# Patient Record
Sex: MALE | Race: WHITE | Employment: FULL TIME | URBAN - METROPOLITAN AREA
[De-identification: names, ages, dates, MRNs, and addresses within clinical notes are randomized per-mention and may not be internally consistent; named-entity substitution may affect disease eponyms.]

---

## 2018-02-21 ENCOUNTER — OFFICE VISIT (OUTPATIENT)
Dept: INTERNAL MEDICINE CLINIC | Age: 63
End: 2018-02-21

## 2018-02-21 VITALS
TEMPERATURE: 97.2 F | HEIGHT: 71 IN | HEART RATE: 52 BPM | BODY MASS INDEX: 33.6 KG/M2 | WEIGHT: 240 LBS | DIASTOLIC BLOOD PRESSURE: 80 MMHG | OXYGEN SATURATION: 96 % | RESPIRATION RATE: 16 BRPM | SYSTOLIC BLOOD PRESSURE: 118 MMHG

## 2018-02-21 DIAGNOSIS — R53.82 CHRONIC FATIGUE: ICD-10-CM

## 2018-02-21 DIAGNOSIS — Z12.5 PROSTATE CANCER SCREENING: ICD-10-CM

## 2018-02-21 DIAGNOSIS — N52.9 IMPOTENCE: ICD-10-CM

## 2018-02-21 DIAGNOSIS — Z00.00 ANNUAL PHYSICAL EXAM: Primary | ICD-10-CM

## 2018-02-21 NOTE — PROGRESS NOTES
1. Have you been to the ER, urgent care clinic since your last visit? Hospitalized since your last visit? No    2. Have you seen or consulted any other health care providers outside of the [address] [place] [address] since your last visit? Include any pap smears or colon screening.  No

## 2018-02-22 NOTE — PROGRESS NOTES
The patient presents to the office today for a check up    HPI    The patient presents to the office today for a check up. The patient remains on no medications. The patient has no complaints but he notes that his sexual function is poor. Review of Systems   Respiratory: Negative for shortness of breath. Cardiovascular: Negative for chest pain and leg swelling. Allergies   Allergen Reactions    Demerol [Meperidine] Nausea Only    Penicillin G Not Reported This Time           Past Medical History:   Diagnosis Date    Abdominal pain     Bursitis        Past Surgical History:   Procedure Laterality Date    HX ACL RECONSTRUCTION Left     HX LUMBAR FUSION  1975 & 1998    x2       Social History     Social History    Marital status:      Spouse name: N/A    Number of children: N/A    Years of education: N/A     Occupational History    Not on file. Social History Main Topics    Smoking status: Never Smoker    Smokeless tobacco: Never Used    Alcohol use No    Drug use: No    Sexual activity: Not on file     Other Topics Concern    Not on file     Social History Narrative       Patient does not have an advanced directive on file    Visit Vitals    /80 (BP 1 Location: Left arm, BP Patient Position: Sitting)    Pulse (!) 52    Temp 97.2 °F (36.2 °C) (Tympanic)    Resp 16    Ht 5' 11\" (1.803 m)    Wt 240 lb (108.9 kg)    SpO2 96%    BMI 33.47 kg/m2       Physical Exam   No Cervical Lymphadenopathy  No Supraclavicular Lymphadenopathy  Thyroid is Normal  Lungs are normal to percussion. Clear to auscultation   Heart:  S1 S2 are normal, No gallops, No mummers  No Carotid Bruits  Abdomen:  Normal Bowel Sounds. No tenderness. No masses. No Hepatomegaly or Splenomegly  LE:  Strong Pedal Pulses.   No Edema      BMI:  The patient was advised to limit calories to 2000 johanne and carbohydrates to 100 grams daily      No visits with results within 3 Month(s) from this visit. Latest known visit with results is:    Orders Only on 11/03/2016   Component Date Value Ref Range Status    % Free testosterone 11/03/2016 2.02  1.50 - 4.2 % Final    Free testosterone (Direct) 11/03/2016 8.58  5.00 - 21 ng/dL Final    Testosterone 11/03/2016 425  348 - 1197 ng/dL Final    Comment 11/03/2016 Comment   Final    Comment: Adult male reference interval is based on a population of lean males  up to 36years old. Performed at:  [address]  [id number]  : Chase Gama MD, Phone:  [phone]         . No results found for any visits on 02/21/18. Assessment / Plan      ICD-10-CM ICD-9-CM    1. Annual physical exam Z00.00 V70.0 CBC WITH AUTOMATED DIFF      METABOLIC PANEL, COMPREHENSIVE      LIPID PANEL      PSA SCREENING (SCREENING)      TESTOSTERONE, FREE & TOTAL   2. Prostate cancer screening Z12.5 V76.44 CBC WITH AUTOMATED DIFF      METABOLIC PANEL, COMPREHENSIVE      LIPID PANEL      PSA SCREENING (SCREENING)      TESTOSTERONE, FREE & TOTAL   3. Impotence N52.9 607.84 CBC WITH AUTOMATED DIFF      METABOLIC PANEL, COMPREHENSIVE      LIPID PANEL      PSA SCREENING (SCREENING)      TESTOSTERONE, FREE & TOTAL   4. Chronic fatigue R53.82 780.79 CBC WITH AUTOMATED DIFF      METABOLIC PANEL, COMPREHENSIVE      LIPID PANEL      PSA SCREENING (SCREENING)      TESTOSTERONE, FREE & TOTAL     Labs  I advised the patient to continue good health habits  he was advised to continue his maintenance medications    Follow-up Disposition:  Return in about 8 months (around 10/21/2018). I asked Juan Antonio Randolph if he has any questions and I answered the questions.   Juan Antonio Randolph states that he understands the treatment plan and agrees with the treatment plan

## 2018-02-28 LAB
% FREE TESTOSTERONE: 2.4 %
A-G RATIO,AGRAT: 2.2 RATIO (ref 1.1–2.6)
ABSOLUTE LYMPHOCYTE COUNT, 10803: 1 K/UL (ref 1–4.8)
ALBUMIN SERPL-MCNC: 4.4 G/DL (ref 3.5–5)
ALP SERPL-CCNC: 71 U/L (ref 40–125)
ALT SERPL-CCNC: 20 U/L (ref 5–40)
ANION GAP SERPL CALC-SCNC: 13 MMOL/L
AST SERPL W P-5'-P-CCNC: 21 U/L (ref 10–37)
BASOPHILS # BLD: 0 K/UL (ref 0–0.2)
BASOPHILS NFR BLD: 1 % (ref 0–2)
BILIRUB SERPL-MCNC: 0.7 MG/DL (ref 0.2–1.2)
BUN SERPL-MCNC: 19 MG/DL (ref 6–22)
CALCIUM SERPL-MCNC: 9.3 MG/DL (ref 8.4–10.4)
CHLORIDE SERPL-SCNC: 105 MMOL/L (ref 98–110)
CHOLEST SERPL-MCNC: 168 MG/DL (ref 110–200)
CO2 SERPL-SCNC: 26 MMOL/L (ref 20–32)
CREAT SERPL-MCNC: 1 MG/DL (ref 0.8–1.6)
EOSINOPHIL # BLD: 0 K/UL (ref 0–0.5)
EOSINOPHIL NFR BLD: 1 % (ref 0–6)
ERYTHROCYTE [DISTWIDTH] IN BLOOD BY AUTOMATED COUNT: 13.6 % (ref 10–16)
FREE TESTOSTERONE,DIRECT, 144981: 10.51 NG/DL
GFRAA, 66117: >60
GFRNA, 66118: >60
GLOBULIN,GLOB: 2 G/DL (ref 2–4)
GLUCOSE SERPL-MCNC: 106 MG/DL (ref 70–99)
GRANULOCYTES,GRANS: 66 % (ref 40–75)
HCT VFR BLD AUTO: 46.7 % (ref 39.3–51.6)
HDLC SERPL-MCNC: 3 MG/DL (ref 0–5)
HDLC SERPL-MCNC: 56 MG/DL (ref 40–59)
HGB BLD-MCNC: 15.6 G/DL (ref 13.1–17.2)
LDLC SERPL CALC-MCNC: 97 MG/DL (ref 50–99)
LYMPHOCYTES, LYMLT: 27 % (ref 27–45)
MCH RBC QN AUTO: 30 PG (ref 26–34)
MCHC RBC AUTO-ENTMCNC: 33 G/DL (ref 32–36)
MCV RBC AUTO: 90 FL (ref 80–95)
MONOCYTES # BLD: 0.2 K/UL (ref 0.1–0.9)
MONOCYTES NFR BLD: 6 % (ref 3–9)
NEUTROPHILS # BLD AUTO: 2.3 K/UL (ref 1.8–7.7)
PLATELET # BLD AUTO: 123 K/UL (ref 140–440)
PMV BLD AUTO: 13.6 FL (ref 6–10.8)
POTASSIUM SERPL-SCNC: 5 MMOL/L (ref 3.5–5.5)
PROT SERPL-MCNC: 6.4 G/DL (ref 6.2–8.1)
PSA SERPL-MCNC: 0.5 NG/ML
RBC # BLD AUTO: 5.19 M/UL (ref 3.8–5.8)
SODIUM SERPL-SCNC: 144 MMOL/L (ref 133–145)
TESTOSTERONE: 438 NG/DL
TRIGL SERPL-MCNC: 75 MG/DL (ref 40–149)
VLDLC SERPL CALC-MCNC: 15 MG/DL (ref 8–30)
WBC # BLD AUTO: 3.6 K/UL (ref 4–11)

## 2018-03-01 ENCOUNTER — TELEPHONE (OUTPATIENT)
Dept: INTERNAL MEDICINE CLINIC | Age: 63
End: 2018-03-01

## 2019-07-29 ENCOUNTER — OFFICE VISIT (OUTPATIENT)
Dept: UROLOGY | Age: 64
End: 2019-07-29

## 2019-07-29 VITALS
HEIGHT: 71 IN | BODY MASS INDEX: 35.28 KG/M2 | TEMPERATURE: 97.9 F | SYSTOLIC BLOOD PRESSURE: 146 MMHG | DIASTOLIC BLOOD PRESSURE: 72 MMHG | RESPIRATION RATE: 12 BRPM | OXYGEN SATURATION: 97 % | HEART RATE: 53 BPM | WEIGHT: 252 LBS

## 2019-07-29 DIAGNOSIS — R35.1 NOCTURIA: Primary | ICD-10-CM

## 2019-07-29 LAB
BILIRUB UR QL STRIP: NEGATIVE
GLUCOSE UR-MCNC: NEGATIVE MG/DL
KETONES P FAST UR STRIP-MCNC: NEGATIVE MG/DL
PH UR STRIP: 5.5 [PH] (ref 4.6–8)
PROT UR QL STRIP: NEGATIVE
SP GR UR STRIP: 1.02 (ref 1–1.03)
UA UROBILINOGEN AMB POC: NORMAL (ref 0.2–1)
URINALYSIS CLARITY POC: CLEAR
URINALYSIS COLOR POC: NORMAL
URINE BLOOD POC: NEGATIVE
URINE LEUKOCYTES POC: NEGATIVE
URINE NITRITES POC: NEGATIVE

## 2019-07-29 RX ORDER — LOSARTAN POTASSIUM 50 MG/1
TABLET ORAL
COMMUNITY
Start: 2019-05-22 | End: 2020-07-16

## 2019-07-29 RX ORDER — POTASSIUM CHLORIDE 750 MG/1
TABLET, EXTENDED RELEASE ORAL
Qty: 30 TAB | Refills: 6 | Status: SHIPPED | OUTPATIENT
Start: 2019-07-29 | End: 2020-07-16

## 2019-07-29 RX ORDER — SERTRALINE HYDROCHLORIDE 100 MG/1
TABLET, FILM COATED ORAL
Refills: 0 | COMMUNITY
Start: 2019-07-10

## 2019-07-29 RX ORDER — TRAZODONE HYDROCHLORIDE 50 MG/1
TABLET ORAL
COMMUNITY
End: 2021-09-09

## 2019-07-29 RX ORDER — SERTRALINE HYDROCHLORIDE 50 MG/1
TABLET, FILM COATED ORAL
Refills: 0 | COMMUNITY
Start: 2019-07-10 | End: 2021-06-09

## 2019-07-29 RX ORDER — SILDENAFIL 100 MG/1
TABLET, FILM COATED ORAL
COMMUNITY
End: 2021-12-09

## 2019-07-29 RX ORDER — CHLORTHALIDONE 25 MG/1
TABLET ORAL
Refills: 0 | COMMUNITY
Start: 2019-06-22 | End: 2020-07-16

## 2019-07-29 RX ORDER — FUROSEMIDE 20 MG/1
TABLET ORAL
Qty: 30 TAB | Refills: 6 | Status: SHIPPED | OUTPATIENT
Start: 2019-07-29 | End: 2020-07-16

## 2019-07-29 RX ORDER — ACETAMINOPHEN 500 MG
TABLET ORAL
COMMUNITY

## 2019-07-29 NOTE — PROGRESS NOTES
ROOM # 6    Grecia Cruz presents today for   Chief Complaint   Patient presents with    Nocturia       Grecia Cruz preferred language for health care discussion is english/other. Is someone accompanying this pt? no    Is the patient using any DME equipment during [address]? no    Depression Screening:  3 most recent PHQ Screens 7/29/2019   Little interest or pleasure in doing things Not at all   Feeling down, depressed, irritable, or hopeless Not at all   Total Score PHQ 2 0       Learning Assessment:  Learning Assessment 11/3/2016   PRIMARY LEARNER Patient   PRIMARY LANGUAGE ENGLISH   LEARNER PREFERENCE PRIMARY READING   ANSWERED BY Patient   RELATIONSHIP SELF       Abuse Screening:  No flowsheet data found. Fall Risk  No flowsheet data found. Health Maintenance reviewed and discussed per provider. Yes    Grecia Cruz is due for   Health Maintenance Due   Topic Date Due    Hepatitis C Screening  1955    DTaP/Tdap/Td series (1 - Tdap) 04/24/1976    Shingrix Vaccine Age 50> (1 of 2) 04/24/2005         Please order/place referral if appropriate. Advance Directive:  1. Do you have an advance directive in place? Patient Reply: no    2. If not, would you like material regarding how to put one in place? Patient Reply: no    Coordination of Care:  1. Have you been to the ER, urgent care clinic since your last visit? Hospitalized since your last visit? no    2. Have you seen or consulted any other health care providers outside of the [address] [place] [address] since your last visit? Include any pap smears or colon screening.  no

## 2019-07-29 NOTE — PROGRESS NOTES
Shaye Pace    Chief Complaint   Patient presents with    Nocturia       History and Physical    The patient is a 19-year-old male  who comes now with several issues of concern. The patient has nocturnal frequency up to 4 times at night. There is no nocturnal enuresis and no urgency incontinence. The patient stands up when he voids. The stream is prompt and the volume is roughly equivalent to a daytime void and the patient feels that he empties completely. The patient does admit to some occasional swelling of his ankles by the end of the day. During the day the patient has less of an issue of frequency and the stream is reasonable. The patient denies any  injury surgery or instrumentation. The patient does consume some caffeinated substances    The patient does take chlorthalidone in the morning but does not notice much in the way of a diuresis. The patient does state that he has been advised that he has a modest form of sleep apnea but has not been given a CPAP machine. The patient also has had some progressive difficulty with sexual dysfunction. His libido is normal and sexual opportunity is present. The patient has some difficulty with achieving and sustaining an erection. The patient was given some Viagra to try and said that it did not work but he also did not notice any cutaneous or visual changes when he took the medication. The patient does not have any lower extremity neuropathic or claudication symptoms.     His father developed prostate cancer and had a very late age and apparently had seed implants with control of his PSA thereafter    Past Medical History:   Diagnosis Date    Abdominal pain     Anxiety     Bursitis     Depression     Diarrhea hypertension    Joint pain     Sleeping difficulties     Urine incontinence      Patient Active Problem List   Diagnosis Code    Bursitis M71.9    Impotence N52.9     Past Surgical History:   Procedure Laterality Date  HX ACL RECONSTRUCTION Left     HX LUMBAR FUSION  1975 & 1998    x2     Current Outpatient Medications   Medication Sig Dispense Refill    sertraline (ZOLOFT) 100 mg tablet take 1 tablet by mouth every morning  0    sertraline (ZOLOFT) 50 mg tablet take 1 tablet by mouth every morning  0    cholecalciferol (VITAMIN D3) 2,000 unit cap capsule Vitamin D3 2,000 unit capsule   take 1 capsule by mouth once daily      losartan (COZAAR) 50 mg tablet losartan 50 mg tablet      chlorthalidone (HYGROTEN) 25 mg tablet   0    sildenafil citrate (VIAGRA) 100 mg tablet sildenafil 100 mg tablet   take 1 tablet by mouth daily if needed      traZODone (DESYREL) 50 mg tablet trazodone 50 mg tablet   take 1 tablet by mouth every evening       Allergies   Allergen Reactions    Amlodipine Swelling    Demerol [Meperidine] Nausea Only    Penicillin G Not Reported This Time     Social History     Socioeconomic History    Marital status:      Spouse name: Not on file    Number of children: Not on file    Years of education: Not on file    Highest education level: Not on file   Occupational History    Not on file   Social Needs    Financial resource strain: Not on file    Food insecurity:     Worry: Not on file     Inability: Not on file    Transportation needs:     Medical: Not on file     Non-medical: Not on file   Tobacco Use    Smoking status: Never Smoker    Smokeless tobacco: Never Used   Substance and Sexual Activity    Alcohol use: No    Drug use: No    Sexual activity: Not Currently   Lifestyle    Physical activity:     Days per week: Not on file     Minutes per session: Not on file    Stress: Not on file   Relationships    Social connections:     Talks on phone: Not on file     Gets together: Not on file     Attends [employer] service: Not on file     Active member of club or organization: Not on file     Attends meetings of clubs or organizations: Not on file     Relationship status: Not on file  Intimate partner violence:     Fear of current or ex partner: Not on file     Emotionally abused: Not on file     Physically abused: Not on file     Forced sexual activity: Not on file   Other Topics Concern    Not on file   Social History Narrative    Not on file      Family History   Problem Relation Age of Onset    Cancer Mother     Heart Disease Father     Hypertension Father     Hypertension Sister     Diabetes Other                  Visit Vitals  /72   Pulse (!) 53   Temp 97.9 °F (36.6 °C) (Oral)   Resp 12   Ht 5' 11\" (1.803 m)   Wt 252 lb (114.3 kg)   SpO2 97%   BMI 35.15 kg/m²     Physical        Gen: WDWN adult NAD  Head  : normocephalic,  Normal ROM; eyes with normal pupils, EOMs, no masses;  conjunctiva normal  Neck: normal movement,  no evident mass,  No evident adenopathy, trachea midline,  Lungs: no respiratory distress or difficulties  CV:  No evident peripheral swelling  Abd :bowel sounds normal, no masses, tenderness, organomegaly  Flanks   nontender  -penis is normal with normal meatus. Left scrotal contents reveal normal size of the testis with modest softening. On the right side there is a hydrocele. Rectal tone is normal.  Prostate is 40 g smooth and benign    Extremities- no edema, arthritis, deformity, swelling  Psych- oriented, no evident anxiety, no cognitive impairment evident    Lab Results   Component Value Date/Time    Prostate Specific Ag 0.501 02/21/2018 10:00 AM    Prostate Specific Ag 0.438 10/24/2016 08:40 AM        PSA noted above  Urine analysis is negative          Impression/ PLAN  High-volume nocturnal frequency in a patient on morning doses of diuretic with it afternoon and evening ankle swelling  Sleep apnea  Erectile dysfunction    Plan:  The patient's last PSA was in February 2018 and he needs a repeat PSA.   The patient is advised that, given his family history, he needs to pursue annual digital rectal exam and PSA    The patient will try his current supply of Viagra. He is instructed to try this on a hungry empty stomach and to be aware of whether or not there are vasoactive cutaneous and visual symptoms. If there are not, then we need to consider another generic alternative because this medication is clearly not bioavailable    I am going to give the patient afternoon Lasix and potassium chloride and the patient will take it 6 hours either before bedtime or before he goes out as an [address]    I will call the patient with the PSA result and the patient will return if there are ongoing difficulties        This visit exceeded 30 minutes and >50% was counselling  The patient understands the discussion and plan    PLEASE NOTE:      This document has been produced using voice recognition software.   Unrecognized errors in transcription may be present    Oleg Romero MD

## 2019-07-29 NOTE — PROGRESS NOTES
Prescriptions now sent in via computer for Lasix and potassium chloride.   The patient has been counseled to get clearance from his primary care physician to stop the chlorthalidone and take this instead

## 2019-08-14 ENCOUNTER — DOCUMENTATION ONLY (OUTPATIENT)
Dept: UROLOGY | Age: 64
End: 2019-08-14

## 2019-08-14 NOTE — PROGRESS NOTES
Patient was called an a message was left on voicemail reminding patient they need to go to [place] [id number] or [facility] and have blood work drawn.

## 2019-08-28 ENCOUNTER — DOCUMENTATION ONLY (OUTPATIENT)
Dept: UROLOGY | Age: 64
End: 2019-08-28

## 2019-08-28 NOTE — PROGRESS NOTES
Called patient, reminding patient they are due to have lab drawn. Patient notified they can go to [place] or [facility] laboratories to have this lab work drawn. Patient verbalized understanding.

## 2019-09-13 ENCOUNTER — DOCUMENTATION ONLY (OUTPATIENT)
Dept: UROLOGY | Age: 64
End: 2019-09-13

## 2019-09-13 NOTE — PROGRESS NOTES
Called patient and left message on voicemail reminding patient they are due to have lab drawn. Patient notified they can go to [facility] or [facility] laboratories to have this lab work drawn.

## 2019-12-03 ENCOUNTER — TELEPHONE (OUTPATIENT)
Dept: UROLOGY | Age: 64
End: 2019-12-03

## 2019-12-03 NOTE — TELEPHONE ENCOUNTER
Spoke with patient to let him know to follow up with his PCP for his PSA levels. Patient expressed understanding and stated he has already done that.

## 2020-07-06 ENCOUNTER — APPOINTMENT (OUTPATIENT)
Dept: GENERAL RADIOLOGY | Age: 65
End: 2020-07-06
Attending: EMERGENCY MEDICINE
Payer: MEDICARE

## 2020-07-06 ENCOUNTER — HOSPITAL ENCOUNTER (EMERGENCY)
Age: 65
Discharge: HOME | End: 2020-07-06
Attending: EMERGENCY MEDICINE
Payer: MEDICARE

## 2020-07-06 VITALS
RESPIRATION RATE: 18 BRPM | DIASTOLIC BLOOD PRESSURE: 80 MMHG | HEART RATE: 58 BPM | TEMPERATURE: 98 F | SYSTOLIC BLOOD PRESSURE: 142 MMHG | OXYGEN SATURATION: 99 %

## 2020-07-06 DIAGNOSIS — R07.9 CHEST PAIN, UNSPECIFIED TYPE: Primary | ICD-10-CM

## 2020-07-06 LAB
ALBUMIN SERPL-MCNC: 3.7 G/DL (ref 3.4–5)
ALBUMIN/GLOB SERPL: 1.2 {RATIO} (ref 0.8–1.7)
ALP SERPL-CCNC: 82 U/L (ref 45–117)
ALT SERPL-CCNC: 27 U/L (ref 16–61)
ANION GAP SERPL CALC-SCNC: 4 MMOL/L (ref 3–18)
AST SERPL-CCNC: 13 U/L (ref 10–38)
BASOPHILS # BLD: 0 K/UL (ref 0–0.1)
BASOPHILS NFR BLD: 0 % (ref 0–2)
BILIRUB SERPL-MCNC: 0.5 MG/DL (ref 0.2–1)
BUN SERPL-MCNC: 17 MG/DL (ref 7–18)
BUN/CREAT SERPL: 18 (ref 12–20)
CALCIUM SERPL-MCNC: 8.7 MG/DL (ref 8.5–10.1)
CHLORIDE SERPL-SCNC: 110 MMOL/L (ref 100–111)
CK MB CFR SERPL CALC: 2.5 % (ref 0–4)
CK MB SERPL-MCNC: 1.2 NG/ML (ref 5–25)
CK SERPL-CCNC: 48 U/L (ref 39–308)
CO2 SERPL-SCNC: 26 MMOL/L (ref 21–32)
CREAT SERPL-MCNC: 0.95 MG/DL (ref 0.6–1.3)
DIFFERENTIAL METHOD BLD: ABNORMAL
EOSINOPHIL # BLD: 0.1 K/UL (ref 0–0.4)
EOSINOPHIL NFR BLD: 1 % (ref 0–5)
ERYTHROCYTE [DISTWIDTH] IN BLOOD BY AUTOMATED COUNT: 13.7 % (ref 11.6–14.5)
GLOBULIN SER CALC-MCNC: 3 G/DL (ref 2–4)
GLUCOSE SERPL-MCNC: 108 MG/DL (ref 74–99)
HCT VFR BLD AUTO: 46.5 % (ref 36–48)
HGB BLD-MCNC: 15.8 G/DL (ref 13–16)
LYMPHOCYTES # BLD: 1.3 K/UL (ref 0.9–3.6)
LYMPHOCYTES NFR BLD: 17 % (ref 21–52)
MCH RBC QN AUTO: 29.2 PG (ref 24–34)
MCHC RBC AUTO-ENTMCNC: 34 G/DL (ref 31–37)
MCV RBC AUTO: 85.8 FL (ref 74–97)
MONOCYTES # BLD: 0.5 K/UL (ref 0.05–1.2)
MONOCYTES NFR BLD: 7 % (ref 3–10)
NEUTS SEG # BLD: 5.8 K/UL (ref 1.8–8)
NEUTS SEG NFR BLD: 75 % (ref 40–73)
PLATELET # BLD AUTO: 108 K/UL (ref 135–420)
PMV BLD AUTO: 12.7 FL (ref 9.2–11.8)
POTASSIUM SERPL-SCNC: 3.9 MMOL/L (ref 3.5–5.5)
PROT SERPL-MCNC: 6.7 G/DL (ref 6.4–8.2)
RBC # BLD AUTO: 5.42 M/UL (ref 4.7–5.5)
SODIUM SERPL-SCNC: 140 MMOL/L (ref 136–145)
TROPONIN I SERPL-MCNC: <0.02 NG/ML (ref 0–0.04)
WBC # BLD AUTO: 7.7 K/UL (ref 4.6–13.2)

## 2020-07-06 PROCEDURE — 93005 ELECTROCARDIOGRAM TRACING: CPT

## 2020-07-06 PROCEDURE — 82550 ASSAY OF CK (CPK): CPT

## 2020-07-06 PROCEDURE — 80053 COMPREHEN METABOLIC PANEL: CPT

## 2020-07-06 PROCEDURE — 99283 EMERGENCY DEPT VISIT LOW MDM: CPT

## 2020-07-06 PROCEDURE — 71045 X-RAY EXAM CHEST 1 VIEW: CPT

## 2020-07-06 PROCEDURE — 85025 COMPLETE CBC W/AUTO DIFF WBC: CPT

## 2020-07-06 NOTE — ED TRIAGE NOTES
Pt reports having chest pain and SOB. Pt also has a feeling of anxiety. Pt additionally has diaphoresis and reports generally not feeling well. Pt has just returned from a trip. Pt PCP has arranged for cardiology consult.

## 2020-07-07 LAB
ATRIAL RATE: 75 BPM
CALCULATED P AXIS, ECG09: 48 DEGREES
CALCULATED R AXIS, ECG10: 1 DEGREES
CALCULATED T AXIS, ECG11: 29 DEGREES
DIAGNOSIS, 93000: NORMAL
P-R INTERVAL, ECG05: 174 MS
Q-T INTERVAL, ECG07: 362 MS
QRS DURATION, ECG06: 88 MS
QTC CALCULATION (BEZET), ECG08: 404 MS
VENTRICULAR RATE, ECG03: 75 BPM

## 2020-07-07 NOTE — ED PROVIDER NOTES
EMERGENCY DEPARTMENT HISTORY AND PHYSICAL EXAM    8:02 PM seen standing in hallway, no rooms available, severe nursing shortage and crowding. Date: 7/6/2020  Patient Name: Suzan Frankel    History of Presenting Illness     Chief Complaint   Patient presents with    Chest Pain     History Provided By: patient    Additional History (Context): Suzan Frankel is a 72 y.o. male presents with no contributory medical history is had months of periodic chest discomfort and discomfort between his shoulder blades sweats for no reason vague nausea, he set up for cardiology visit on Wednesday. He went on a vacation and since returning back is had persistent pain between his shoulder blades and sweats difficulty breathing. He thinks it may be wearing the mask. His discomfort is mild no medications tried. No clearly exertional or pleuritic component. Elton Chavez     PCP: Taiwo Padilla MD    Chief Complaint:   Duration:    Timing:    Location:   Quality:   Severity:   Modifying Factors:   Associated Symptoms:       Current Outpatient Medications   Medication Sig Dispense Refill    sertraline (ZOLOFT) 100 mg tablet take 1 tablet by mouth every morning  0    sertraline (ZOLOFT) 50 mg tablet take 1 tablet by mouth every morning  0    cholecalciferol (VITAMIN D3) 2,000 unit cap capsule Vitamin D3 2,000 unit capsule   take 1 capsule by mouth once daily      losartan (COZAAR) 50 mg tablet losartan 50 mg tablet      chlorthalidone (HYGROTEN) 25 mg tablet   0    sildenafil citrate (VIAGRA) 100 mg tablet sildenafil 100 mg tablet   take 1 tablet by mouth daily if needed      traZODone (DESYREL) 50 mg tablet trazodone 50 mg tablet   take 1 tablet by mouth every evening      furosemide (LASIX) 20 mg tablet 1 tablet by mouth every day 6 hours before bedtime 30 Tab 6    potassium chloride (KLOR-CON) 10 mEq tablet 1 tablet by mouth every day 6 hours before bedtime 30 Tab 6       Past History     Past Medical History:  Past Medical History:   Diagnosis Date    Abdominal pain     Anxiety     Bursitis     Depression     Diarrhea hypertension    Joint pain     Sleeping difficulties     Urine incontinence        Past Surgical History:  Past Surgical History:   Procedure Laterality Date    HX ACL RECONSTRUCTION Left     HX LUMBAR FUSION  1975 & 1998    x2       Family History:  Family History   Problem Relation Age of Onset    Cancer Mother     Heart Disease Father     Hypertension Father     Hypertension Sister     Diabetes Other        Social History:  Social History     Tobacco Use    Smoking status: Never Smoker    Smokeless tobacco: Never Used   Substance Use Topics    Alcohol use: No    Drug use: No       Allergies: Allergies   Allergen Reactions    Amlodipine Swelling    Demerol [Meperidine] Nausea Only    Penicillin G Not Reported This Time         Review of Systems     Review of Systems   Constitutional: Negative for diaphoresis and fever. HENT: Negative for congestion and sore throat. Eyes: Negative for pain and itching. Respiratory: Positive for shortness of breath. Negative for cough. Cardiovascular: Positive for chest pain. Negative for palpitations. Gastrointestinal: Negative for abdominal pain and diarrhea. Endocrine: Negative for polydipsia and polyuria. Genitourinary: Negative for dysuria and hematuria. Musculoskeletal: Negative for arthralgias and myalgias. Skin: Negative for rash and wound. Neurological: Negative for seizures and syncope. Hematological: Does not bruise/bleed easily. Psychiatric/Behavioral: Negative for agitation and hallucinations. Physical Exam       Patient Vitals for the past 12 hrs:   Temp Pulse Resp BP SpO2   07/06/20 1918 98.3 °F (36.8 °C) 78 18 (!) 162/94 98 %       Physical Exam  Vitals signs and nursing note reviewed. Constitutional:       General: He is not in acute distress. Appearance: He is well-developed.  He is not toxic-appearing. HENT:      Head: Normocephalic and atraumatic. Eyes:      General: No scleral icterus. Conjunctiva/sclera: Conjunctivae normal.   Neck:      Musculoskeletal: Normal range of motion and neck supple. Vascular: No JVD. Cardiovascular:      Rate and Rhythm: Normal rate and regular rhythm. Heart sounds: Normal heart sounds. Comments: 4 intact extremity pulses  Pulmonary:      Effort: Pulmonary effort is normal.      Breath sounds: Normal breath sounds. Abdominal:      Palpations: Abdomen is soft. There is no mass. Tenderness: There is no abdominal tenderness. Musculoskeletal: Normal range of motion. Lymphadenopathy:      Cervical: No cervical adenopathy. Skin:     General: Skin is warm. Comments: Slightly diaphoretic   Neurological:      Mental Status: He is alert. Diagnostic Study Results   Labs -  Recent Results (from the past 12 hour(s))   CBC WITH AUTOMATED DIFF    Collection Time: 07/06/20  9:02 PM   Result Value Ref Range    WBC 7.7 4.6 - 13.2 K/uL    RBC 5.42 4.70 - 5.50 M/uL    HGB 15.8 13.0 - 16.0 g/dL    HCT 46.5 36.0 - 48.0 %    MCV 85.8 74.0 - 97.0 FL    MCH 29.2 24.0 - 34.0 PG    MCHC 34.0 31.0 - 37.0 g/dL    RDW 13.7 11.6 - 14.5 %    PLATELET 954 (L) 513 - 420 K/uL    MPV 12.7 (H) 9.2 - 11.8 FL    NEUTROPHILS 75 (H) 40 - 73 %    LYMPHOCYTES 17 (L) 21 - 52 %    MONOCYTES 7 3 - 10 %    EOSINOPHILS 1 0 - 5 %    BASOPHILS 0 0 - 2 %    ABS. NEUTROPHILS 5.8 1.8 - 8.0 K/UL    ABS. LYMPHOCYTES 1.3 0.9 - 3.6 K/UL    ABS. MONOCYTES 0.5 0.05 - 1.2 K/UL    ABS. EOSINOPHILS 0.1 0.0 - 0.4 K/UL    ABS.  BASOPHILS 0.0 0.0 - 0.1 K/UL    DF AUTOMATED     METABOLIC PANEL, COMPREHENSIVE    Collection Time: 07/06/20  9:02 PM   Result Value Ref Range    Sodium 140 136 - 145 mmol/L    Potassium 3.9 3.5 - 5.5 mmol/L    Chloride 110 100 - 111 mmol/L    CO2 26 21 - 32 mmol/L    Anion gap 4 3.0 - 18 mmol/L    Glucose 108 (H) 74 - 99 mg/dL    BUN 17 7.0 - 18 MG/DL Creatinine 0.95 0.6 - 1.3 MG/DL    BUN/Creatinine ratio 18 12 - 20      GFR est AA >60 >60 ml/min/1.73m2    GFR est non-AA >60 >60 ml/min/1.73m2    Calcium 8.7 8.5 - 10.1 MG/DL    Bilirubin, total 0.5 0.2 - 1.0 MG/DL    ALT (SGPT) 27 16 - 61 U/L    AST (SGOT) 13 10 - 38 U/L    Alk. phosphatase 82 45 - 117 U/L    Protein, total 6.7 6.4 - 8.2 g/dL    Albumin 3.7 3.4 - 5.0 g/dL    Globulin 3.0 2.0 - 4.0 g/dL    A-G Ratio 1.2 0.8 - 1.7     CARDIAC PANEL,(CK, CKMB & TROPONIN)    Collection Time: 07/06/20  9:02 PM   Result Value Ref Range    CK - MB 1.2 <3.6 ng/ml    CK-MB Index 2.5 0.0 - 4.0 %    CK 48 39 - 308 U/L    Troponin-I, QT <0.02 0.0 - 0.045 NG/ML       Radiologic Studies -   XR CHEST SNGL V    (Results Pending)     No results found. Medications ordered:   Medications - No data to display      Medical Decision Making   Initial Medical Decision Making and DDx:  Concern for ACS unstable angina, less likely PE. Doubt pneumothorax pneumonia. ED Course: Progress Notes, Reevaluation, and Consults:  ED Course as of Jul 06 2251 Mon Jul 06, 2020 2012 Twelve-lead EKG 2009, sinus rhythm at 75 no acute process    [CB]      ED Course User Index  [CB] Gunner Ortez MD     10:51 PM Pt reevaluated at this time. Discussed results and findings, as well as, diagnosis and plan for discharge. Follow up with doctors/services listed. Return to the emergency department for alarming symptoms. Pt verbalizes understanding and agreement with plan. All questions addressed. Asymptomatic no symptoms currently, negative cardiac markers, do not suspect pulmonary embolism specifically. He will be discharged. Negative chest x-ray. I am the first provider for this patient. I reviewed the vital signs, available nursing notes, past medical history, past surgical history, family history and social history.     Patient Vitals for the past 12 hrs:   Temp Pulse Resp BP SpO2   07/06/20 1918 98.3 °F (36.8 °C) 78 18 (!) 162/94 98 %       Vital Signs-Reviewed the patient's vital signs. Pulse Oximetry Analysis, Cardiac Monitor, 12 lead ek% room air  Interpreted by the EP. Records Reviewed: Nursing notes reviewed (Time of Review: 8:02 PM)    Procedures:   Critical Care Time:   Aspirin: (was aspirin given for stroke?)    Diagnosis     Clinical Impression:   1.  Chest pain, unspecified type        Disposition: Discharged      Follow-up Information     Follow up With Specialties Details Why Contact Info    Cardiology  In 2 days             Patient's Medications   Start Taking    No medications on file   Continue Taking    CHLORTHALIDONE (HYGROTEN) 25 MG TABLET        CHOLECALCIFEROL (VITAMIN D3) 2,000 UNIT CAP CAPSULE    Vitamin D3 2,000 unit capsule   take 1 capsule by mouth once daily    FUROSEMIDE (LASIX) 20 MG TABLET    1 tablet by mouth every day 6 hours before bedtime    LOSARTAN (COZAAR) 50 MG TABLET    losartan 50 mg tablet    POTASSIUM CHLORIDE (KLOR-CON) 10 MEQ TABLET    1 tablet by mouth every day 6 hours before bedtime    SERTRALINE (ZOLOFT) 100 MG TABLET    take 1 tablet by mouth every morning    SERTRALINE (ZOLOFT) 50 MG TABLET    take 1 tablet by mouth every morning    SILDENAFIL CITRATE (VIAGRA) 100 MG TABLET    sildenafil 100 mg tablet   take 1 tablet by mouth daily if needed    TRAZODONE (DESYREL) 50 MG TABLET    trazodone 50 mg tablet   take 1 tablet by mouth every evening   These Medications have changed    No medications on file   Stop Taking    No medications on file     _______________________________    Notes:    Gabo Gabriel MD using Dragon dictation      _______________________________

## 2020-07-07 NOTE — DISCHARGE INSTRUCTIONS
Patient Education        Chest Pain: Care Instructions  Your Care Instructions     There are many things that can cause chest pain. Some are not serious and will get better on their own in a few days. But some kinds of chest pain need more testing and treatment. Your doctor may have recommended a follow-up visit in the next 8 to 12 hours. If you are not getting better, you may need more tests or treatment. Even though your doctor has released you, you still need to watch for any problems. The doctor carefully checked you, but sometimes problems can develop later. If you have new symptoms or if your symptoms do not get better, get medical care right away. If you have worse or different chest pain or pressure that lasts more than 5 minutes or you passed out (lost consciousness), [id number] or seek other emergency help right away. A medical visit is only one step in your treatment. Even if you feel better, you still need to do what your doctor recommends, such as going to all suggested follow-up appointments and taking medicines exactly as directed. This will help you recover and help prevent future problems. How can you care for yourself at home? · Rest until you feel better. · Take your medicine exactly as prescribed. Call your doctor if you think you are having a problem with your medicine. · Do not drive after taking a prescription pain medicine. When should you call for help? [id number]:   · You passed out (lost consciousness). · You have severe difficulty breathing. · You have symptoms of a heart attack. These may include:  ? Chest pain or pressure, or a strange feeling in your chest.  ? Sweating. ? Shortness of breath. ? Nausea or vomiting. ? Pain, pressure, or a strange feeling in your back, neck, jaw, or upper belly or in one or both shoulders or arms. ? Lightheadedness or sudden weakness. ? A fast or irregular heartbeat.   After you call 911, the  may tell you to chew 1 adult-strength or 2 to 4 low-dose aspirin. Wait for an ambulance. Do not try to drive yourself. Call your doctor today if:   · You have any trouble breathing. · Your chest pain gets worse. · You are dizzy or lightheaded, or you feel like you may faint. · You are not getting better as expected. · You are having new or different chest pain. Where can you learn more? Go to http://ana-kristen.info/  Enter A120 in the search box to learn more about \"Chest Pain: Care Instructions. \"  Current as of: June 26, 2019               Content Version: 12.5  © 4744-8199 [employer], [employer]. Care instructions adapted under license by [employer] (which disclaims liability or warranty for this information). If you have questions about a medical condition or this instruction, always ask your healthcare professional. [address] any warranty or liability for your use of this information.

## 2020-07-08 ENCOUNTER — OFFICE VISIT (OUTPATIENT)
Dept: CARDIOLOGY CLINIC | Age: 65
End: 2020-07-08

## 2020-07-08 VITALS
SYSTOLIC BLOOD PRESSURE: 144 MMHG | HEIGHT: 71 IN | WEIGHT: 268 LBS | DIASTOLIC BLOOD PRESSURE: 84 MMHG | OXYGEN SATURATION: 97 % | BODY MASS INDEX: 37.52 KG/M2 | HEART RATE: 64 BPM

## 2020-07-08 DIAGNOSIS — I10 ESSENTIAL HYPERTENSION: ICD-10-CM

## 2020-07-08 DIAGNOSIS — R07.9 CHEST PAIN, UNSPECIFIED TYPE: Primary | ICD-10-CM

## 2020-07-08 DIAGNOSIS — R06.09 DYSPNEA ON EXERTION: ICD-10-CM

## 2020-07-08 DIAGNOSIS — R60.9 DEPENDENT EDEMA: ICD-10-CM

## 2020-07-08 DIAGNOSIS — Z99.89 OBSTRUCTIVE SLEEP APNEA ON CPAP: ICD-10-CM

## 2020-07-08 DIAGNOSIS — G47.33 OBSTRUCTIVE SLEEP APNEA ON CPAP: ICD-10-CM

## 2020-07-08 DIAGNOSIS — E66.01 SEVERE OBESITY (HCC): ICD-10-CM

## 2020-07-08 NOTE — LETTER
7/8/2020 3:05 PM 
 
 
 
Cliff Zurita 
[id number] 
1955 Insurance:  Medicare/Rafael Dominguez # No Auth Needed Proc Date: Wed. 7/15                Proc Time:  11:45am 
 
Performing MD : Dr. Kendall Bangura                      Procedure:R&L Cath Hospital:  [facility]                                            PCP Dr. Megan Pimentel Scheduled with:  Cecily/EMail                                                        Date:7/8/2020 HP: 7/8      EKG: ______    Labs:______  CXR: _______  Orders: 7/8 Special Instructions:  _____________________________________________________ 
______________________________________________________________________ 
______________________________________________________________________ Date Faxed:   ______________   Pages Faxed: ___________________ The materials enclosed with this facsimile transmission are private and confidential and are the property of the sender. If you are not the intended recipient, be advised that any unauthorized use, disclosure, copying, distribution, or the taking of any action in reliance on the contents of this telecopied information is strictly prohibited. If you have received this in error, please immediately notify the sender via telephone to arrange for return of the forwarded documentation.

## 2020-07-08 NOTE — PROGRESS NOTES
HISTORY OF PRESENT ILLNESS  Jimmye Severe is a 72 y.o. male. HPI    Patient presents for a new office visit. He was referred here by his PCP for evaluation of episodic chest pain, exertional dyspnea and increased leg swelling which has been more noticeable over the past 6 months. Patient underwent noninvasive cardiac testing back in February 2019 and was evaluated by another cardiologist at that time. Both of his tests are unremarkable with the exception of concentric LVH on his echocardiogram.  Patient reports that he was evaluated in the emergency room at that time for chest pain and severely elevated blood pressure and was started on antihypertensive therapy. However, for the past 6 months, he has noted decreased activity tolerance, profuse sweating with exertional activity along with worsening shortness of breath. He describes his chest pain as a tightness in the middle of his chest which usually occurs at rest and is not particular worse with exertion. He also complains of intermittent pain between his shoulder blades which is also more frequent at rest.  He has noticed increased leg swelling as well. He was started on scheduled furosemide by his PCP which has improved his symptoms. He denies any orthopnea or PND. No significant change in his weight over the past 6 months. He states he was started on testosterone replacement therapy about 6 months ago.     Past Medical History:   Diagnosis Date    Abdominal pain     Anxiety     Bursitis     Depression     Diarrhea hypertension    Essential hypertension     H/O echocardiogram 02/2019    EF 54%, mild to moderate LVH, no valvular heart disease    History of nuclear stress test 02/2019    Normal low risk study, EF 56%    Joint pain     Obstructive sleep apnea on CPAP 01/2020    Urine incontinence      Current Outpatient Medications   Medication Sig Dispense Refill    sertraline (ZOLOFT) 100 mg tablet take 1 tablet by mouth every morning  0    cholecalciferol (VITAMIN D3) 2,000 unit cap capsule Vitamin D3 2,000 unit capsule   take 1 capsule by mouth once daily      losartan (COZAAR) 50 mg tablet losartan 50 mg tablet      chlorthalidone (HYGROTEN) 25 mg tablet   0    sildenafil citrate (VIAGRA) 100 mg tablet sildenafil 100 mg tablet   take 1 tablet by mouth daily if needed      traZODone (DESYREL) 50 mg tablet trazodone 50 mg tablet   take 1 tablet by mouth every evening      furosemide (LASIX) 20 mg tablet 1 tablet by mouth every day 6 hours before bedtime (Patient taking differently: 40 mg. 1 tablet by mouth every day 6 hours before bedtime) 30 Tab 6    potassium chloride (KLOR-CON) 10 mEq tablet 1 tablet by mouth every day 6 hours before bedtime 30 Tab 6    sertraline (ZOLOFT) 50 mg tablet take 1 tablet by mouth every morning  0     Allergies   Allergen Reactions    Amlodipine Swelling    Demerol [Meperidine] Nausea Only    Penicillin G Not Reported This Time        Social History     Tobacco Use    Smoking status: Never Smoker    Smokeless tobacco: Never Used   Substance Use Topics    Alcohol use: No    Drug use: No     Family History   Problem Relation Age of Onset    Cancer Mother     Heart Disease Father     Hypertension Father     Hypertension Sister     Diabetes Other          Review of Systems   Constitutional: Negative for chills, fever and weight loss. HENT: Negative for nosebleeds. Eyes: Negative for blurred vision and double vision. Respiratory: Positive for shortness of breath. Negative for cough and wheezing. Cardiovascular: Positive for chest pain and leg swelling. Negative for palpitations, orthopnea, claudication and PND. Gastrointestinal: Negative for abdominal pain, heartburn, nausea and vomiting. Genitourinary: Negative for dysuria and hematuria. Musculoskeletal: Negative for falls and myalgias. Skin: Negative for rash.    Neurological: Negative for dizziness, focal weakness and headaches. Endo/Heme/Allergies: Does not bruise/bleed easily. Psychiatric/Behavioral: Negative for substance abuse. Visit Vitals  /84   Pulse 64   Ht 5' 11\" (1.803 m)   Wt 121.6 kg (268 lb)   SpO2 97%   BMI 37.38 kg/m²       Physical Exam   Constitutional: He is oriented to person, place, and time. He appears well-developed and well-nourished. HENT:   Head: Normocephalic and atraumatic. Eyes: Conjunctivae are normal.   Neck: Neck supple. No JVD present. Carotid bruit is not present. Cardiovascular: Normal rate, regular rhythm, S1 normal, S2 normal and normal pulses. Exam reveals distant heart sounds. Exam reveals no gallop and no S3. No murmur heard. Pulmonary/Chest: Breath sounds normal. He has no wheezes. He has no rales. Abdominal: Soft. Bowel sounds are normal. There is no abdominal tenderness. Musculoskeletal:         General: Edema ( Trace bilateral lower extremity) present. No tenderness or deformity. Neurological: He is alert and oriented to person, place, and time. Skin: Skin is warm and dry. Psychiatric: He has a normal mood and affect. His behavior is normal. Thought content normal.     EKG: Normal sinus rhythm, normal axis, borderline low voltage, nonspecific ST elevation, likely due to early repolarization. No change compared to the previous EKG from November 2019. ASSESSMENT and PLAN  Encounter Diagnoses   Name Primary?  Chest pain, unspecified type Yes    Severe obesity ([address]     Essential hypertension     Dyspnea on exertion     Obstructive sleep apnea on CPAP     Dependent edema      Chest pain. Somewhat atypical for angina, but more concerning is his exertional dyspnea and decreased activity tolerance which has been, progressive the past 6 months. I have recommended cardiac catheterization to definitively evaluate for any significant coronary disease and also to assess for evidence of increased left ventricular filling pressures.     Dyspnea on exertion. Cardiac cause will be evaluated for with a right and left heart catheterization in order to assess for significant pulmonary hypertension and diastolic dysfunction. For now I would continue his current diuretic regimen. Essential hypertension. Patient blood pressure mildly elevated today in the office. Depending on results of his cardiac catheterization, may adjust his blood pressure medications at that time. Obstructive sleep apnea. Patient has been recently started on CPAP within the past year or so. He was encouraged to use this regularly. Severe obesity. Patient's weight has been fairly stable over the past 6 months. He was encouraged to try and lose much weight as possible lifestyle modification. Follow-up in 1 month following his cardiac catheterization, sooner if needed.

## 2020-07-08 NOTE — PATIENT INSTRUCTIONS
Right & Left heart cath for unstable angina and SOB Instructions Patients Name:  Cliff Zurita 1. You are scheduled to have a Right and Left Heart Catherization on 7/15/2020  at 11:45 Please check in at 10:45  . 2. Please go to [facility] and park in the outpatient parking lot that is located around to the back of the [facility] and enter through the [facility] building. Once you enter through the [facility] check in with the  there. The  will either give you directions or assist you in getting to the cath holding area. 3. You are not to eat or drink anything after midnight the night before your procedure. Small sips of water to take your medications is ok. 4. If you are diabetic, do not take your insulin/sugar pill the morning of the procedure. 5. MEDICATION INSTRUCTIONS:   Please take your morning medications with the following special instructions: 
 
[x]          Please make sure to take your Blood pressure medication morning of procedure. Hold Lasix 6. We encourage families to wait in the waiting room on the first floor while the procedure is being done. The Doctor will come out and talk with you as soon as the procedure is over. 7. There is the possibility that you may spend the night in the hospital, depending on the results of the procedure. This will be determined after the procedure is done. If angioplasty or stent is planned, you will stay at least one day. 8. If you or your family have any questions, please call our office Monday Friday, 9:00 a. m.4:30 p.m.,  At [phone], and ask to speak to one of the nurses.

## 2020-07-08 NOTE — PROGRESS NOTES
Gilma Peck presents today for   Chief Complaint   Patient presents with    Chest Pain (Angina)     tightness 3-4 times a week with exertion for the past 6 months     Leg Swelling     ankles on/off     Shortness of Breath     with minimal exertion        Gilma Liv preferred language for health care discussion is english/other. Is someone accompanying this pt? yes    Is the patient using any DME equipment during [address]? no    Depression Screening:  3 most recent PHQ Screens 7/29/2019   Little interest or pleasure in doing things Not at all   Feeling down, depressed, irritable, or hopeless Not at all   Total Score PHQ 2 0       Learning Assessment:  Learning Assessment 11/3/2016   PRIMARY LEARNER Patient   PRIMARY LANGUAGE ENGLISH   LEARNER PREFERENCE PRIMARY READING   ANSWERED BY Patient   RELATIONSHIP SELF       Abuse Screening:  No flowsheet data found. Fall Risk  Fall Risk Assessment, last 12 mths 7/8/2020   Able to walk? Yes   Fall in past 12 months? No       Pt currently taking Anticoagulant therapy? no    Coordination of Care:  1. Have you been to the ER, urgent care clinic since your last visit? Hospitalized since your last visit? yes    2. Have you seen or consulted any other health care providers outside of the [address] [place] [address] since your last visit? Include any pap smears or colon screening.  no

## 2020-07-10 ENCOUNTER — HOSPITAL ENCOUNTER (OUTPATIENT)
Dept: PREADMISSION TESTING | Age: 65
Discharge: HOME | End: 2020-07-10
Payer: MEDICARE

## 2020-07-10 DIAGNOSIS — R07.9 CHEST PAIN, UNSPECIFIED TYPE: ICD-10-CM

## 2020-07-10 LAB
ALBUMIN SERPL-MCNC: 3.6 G/DL (ref 3.4–5)
ALBUMIN/GLOB SERPL: 1.4 {RATIO} (ref 0.8–1.7)
ALP SERPL-CCNC: 81 U/L (ref 45–117)
ALT SERPL-CCNC: 26 U/L (ref 16–61)
ANION GAP SERPL CALC-SCNC: 4 MMOL/L (ref 3–18)
AST SERPL-CCNC: 16 U/L (ref 10–38)
BASOPHILS # BLD: 0 K/UL (ref 0–0.1)
BASOPHILS NFR BLD: 1 % (ref 0–2)
BILIRUB SERPL-MCNC: 1 MG/DL (ref 0.2–1)
BUN SERPL-MCNC: 17 MG/DL (ref 7–18)
BUN/CREAT SERPL: 18 (ref 12–20)
CALCIUM SERPL-MCNC: 8.4 MG/DL (ref 8.5–10.1)
CHLORIDE SERPL-SCNC: 111 MMOL/L (ref 100–111)
CO2 SERPL-SCNC: 28 MMOL/L (ref 21–32)
CREAT SERPL-MCNC: 0.96 MG/DL (ref 0.6–1.3)
DIFFERENTIAL METHOD BLD: ABNORMAL
EOSINOPHIL # BLD: 0.1 K/UL (ref 0–0.4)
EOSINOPHIL NFR BLD: 2 % (ref 0–5)
ERYTHROCYTE [DISTWIDTH] IN BLOOD BY AUTOMATED COUNT: 13.5 % (ref 11.6–14.5)
GLOBULIN SER CALC-MCNC: 2.5 G/DL (ref 2–4)
GLUCOSE SERPL-MCNC: 111 MG/DL (ref 74–99)
HCT VFR BLD AUTO: 46.2 % (ref 36–48)
HGB BLD-MCNC: 16 G/DL (ref 13–16)
INR PPP: 1 (ref 0.8–1.2)
LYMPHOCYTES # BLD: 1.1 K/UL (ref 0.9–3.6)
LYMPHOCYTES NFR BLD: 22 % (ref 21–52)
MCH RBC QN AUTO: 29.9 PG (ref 24–34)
MCHC RBC AUTO-ENTMCNC: 34.6 G/DL (ref 31–37)
MCV RBC AUTO: 86.4 FL (ref 74–97)
MONOCYTES # BLD: 0.3 K/UL (ref 0.05–1.2)
MONOCYTES NFR BLD: 6 % (ref 3–10)
NEUTS SEG # BLD: 3.5 K/UL (ref 1.8–8)
NEUTS SEG NFR BLD: 69 % (ref 40–73)
PLATELET # BLD AUTO: 104 K/UL (ref 135–420)
PMV BLD AUTO: 12.4 FL (ref 9.2–11.8)
POTASSIUM SERPL-SCNC: 4.8 MMOL/L (ref 3.5–5.5)
PROT SERPL-MCNC: 6.1 G/DL (ref 6.4–8.2)
PROTHROMBIN TIME: 12.7 SEC (ref 11.5–15.2)
RBC # BLD AUTO: 5.35 M/UL (ref 4.7–5.5)
SODIUM SERPL-SCNC: 143 MMOL/L (ref 136–145)
WBC # BLD AUTO: 5 K/UL (ref 4.6–13.2)

## 2020-07-10 PROCEDURE — 85610 PROTHROMBIN TIME: CPT

## 2020-07-10 PROCEDURE — 85025 COMPLETE CBC W/AUTO DIFF WBC: CPT

## 2020-07-10 PROCEDURE — 36415 COLL VENOUS BLD VENIPUNCTURE: CPT

## 2020-07-10 PROCEDURE — 80053 COMPREHEN METABOLIC PANEL: CPT

## 2020-07-15 ENCOUNTER — HOSPITAL ENCOUNTER (OUTPATIENT)
Age: 65
Setting detail: OUTPATIENT SURGERY
Discharge: HOME | End: 2020-07-15
Attending: INTERNAL MEDICINE | Admitting: INTERNAL MEDICINE
Payer: MEDICARE

## 2020-07-15 VITALS
BODY MASS INDEX: 35.79 KG/M2 | DIASTOLIC BLOOD PRESSURE: 62 MMHG | WEIGHT: 250 LBS | SYSTOLIC BLOOD PRESSURE: 123 MMHG | HEART RATE: 54 BPM | OXYGEN SATURATION: 100 % | HEIGHT: 70 IN

## 2020-07-15 DIAGNOSIS — I20.0 UNSTABLE ANGINA (HCC): ICD-10-CM

## 2020-07-15 DIAGNOSIS — R06.02 SOB (SHORTNESS OF BREATH): ICD-10-CM

## 2020-07-15 LAB — CRD SYSTOLIC BP: 147

## 2020-07-15 PROCEDURE — 77030013797 HC KT TRNSDUC PRSSR EDWD -A: Performed by: INTERNAL MEDICINE

## 2020-07-15 PROCEDURE — 99153 MOD SED SAME PHYS/QHP EA: CPT | Performed by: INTERNAL MEDICINE

## 2020-07-15 PROCEDURE — 74011000250 HC RX REV CODE- 250: Performed by: INTERNAL MEDICINE

## 2020-07-15 PROCEDURE — C1894 INTRO/SHEATH, NON-LASER: HCPCS | Performed by: INTERNAL MEDICINE

## 2020-07-15 PROCEDURE — C1760 CLOSURE DEV, VASC: HCPCS | Performed by: INTERNAL MEDICINE

## 2020-07-15 PROCEDURE — 77030013744: Performed by: INTERNAL MEDICINE

## 2020-07-15 PROCEDURE — 74011250636 HC RX REV CODE- 250/636: Performed by: INTERNAL MEDICINE

## 2020-07-15 PROCEDURE — 74011636320 HC RX REV CODE- 636/320: Performed by: INTERNAL MEDICINE

## 2020-07-15 PROCEDURE — 99152 MOD SED SAME PHYS/QHP 5/>YRS: CPT | Performed by: INTERNAL MEDICINE

## 2020-07-15 PROCEDURE — 93458 L HRT ARTERY/VENTRICLE ANGIO: CPT | Performed by: INTERNAL MEDICINE

## 2020-07-15 PROCEDURE — 77030004558 HC CATH ANGI DX SUPR TORQ CARD -A: Performed by: INTERNAL MEDICINE

## 2020-07-15 PROCEDURE — 74011250637 HC RX REV CODE- 250/637: Performed by: INTERNAL MEDICINE

## 2020-07-15 RX ORDER — FENTANYL CITRATE 50 UG/ML
INJECTION, SOLUTION INTRAMUSCULAR; INTRAVENOUS AS NEEDED
Status: DISCONTINUED | OUTPATIENT
Start: 2020-07-15 | End: 2020-07-15 | Stop reason: HOSPADM

## 2020-07-15 RX ORDER — GUAIFENESIN 100 MG/5ML
81 LIQUID (ML) ORAL
Status: COMPLETED | OUTPATIENT
Start: 2020-07-15 | End: 2020-07-15

## 2020-07-15 RX ORDER — LIDOCAINE HYDROCHLORIDE 10 MG/ML
INJECTION, SOLUTION EPIDURAL; INFILTRATION; INTRACAUDAL; PERINEURAL AS NEEDED
Status: DISCONTINUED | OUTPATIENT
Start: 2020-07-15 | End: 2020-07-15 | Stop reason: HOSPADM

## 2020-07-15 RX ORDER — SPIRONOLACTONE 25 MG/1
25 TABLET ORAL DAILY
Qty: 30 TAB | Refills: 3 | Status: SHIPPED | OUTPATIENT
Start: 2020-07-15 | End: 2021-12-09

## 2020-07-15 RX ORDER — NITROGLYCERIN 400 UG/1
1 SPRAY ORAL
Status: DISCONTINUED | OUTPATIENT
Start: 2020-07-15 | End: 2020-07-16 | Stop reason: HOSPADM

## 2020-07-15 RX ORDER — SODIUM CHLORIDE 9 MG/ML
75 INJECTION, SOLUTION INTRAVENOUS CONTINUOUS
Status: DISCONTINUED | OUTPATIENT
Start: 2020-07-15 | End: 2020-07-15 | Stop reason: HOSPADM

## 2020-07-15 RX ORDER — MIDAZOLAM HYDROCHLORIDE 1 MG/ML
INJECTION, SOLUTION INTRAMUSCULAR; INTRAVENOUS AS NEEDED
Status: DISCONTINUED | OUTPATIENT
Start: 2020-07-15 | End: 2020-07-15 | Stop reason: HOSPADM

## 2020-07-15 RX ORDER — FUROSEMIDE 40 MG/1
40 TABLET ORAL DAILY
Qty: 30 TAB | Refills: 3 | Status: SHIPPED | OUTPATIENT
Start: 2020-07-15 | End: 2021-12-09

## 2020-07-15 RX ORDER — TELMISARTAN 80 MG/1
80 TABLET ORAL DAILY
Qty: 30 TAB | Refills: 5 | Status: SHIPPED | OUTPATIENT
Start: 2020-07-15 | End: 2021-06-07

## 2020-07-15 RX ADMIN — ASPIRIN 81 MG CHEWABLE TABLET 81 MG: 81 TABLET CHEWABLE at 13:27

## 2020-07-15 NOTE — Clinical Note
TRANSFER - OUT REPORT:     Verbal report given to: Aliyah Travis RN. Report consisted of patient's Situation, Background, Assessment and   Recommendations(SBAR). Opportunity for questions and clarification was provided. Patient transported with a Registered Nurse. Patient transported to: [address]

## 2020-07-15 NOTE — Clinical Note
Contrast Dose Calculator:   Patient's age: 72.   Patient's sex: Male. Patient weight (kg) = 113. Creatinine level (mg/dL) = 0.96. Creatinine clearance (mL/min): 122.61. Contrast concentration (mg/mL) = 300. MACD = 300 mL. Max Contrast dose per Creatinine Cl calculator = 275.88 mL.

## 2020-07-15 NOTE — DISCHARGE INSTRUCTIONS
HEART CATHETERIZATION/ANGIOGRAPHY DISCHARGE INSTRUCTIONS    1. Check puncture site frequently for swelling or bleeding. If there is any bleeding, lie down and apply pressure over the area with a clean towel or washcloth. Notify your doctor for any redness, swelling, drainage, or oozing from the puncture site. Notify your doctor for any fever or chills. 2. If the extremity becomes cold, numb, or painful call Dr. Santana Chacko  3. Activity should be limited for the next 48 hours. Climb stairs as little as possible and avoid any stooping, bending, or strenuous activity for 48 hours. No heavy lifting (anything over 10 pounds) for 3 days. 4. You may resume your usual diet. Drink more fluids than usual.  5. Have a responsible person drive you home and stay with you for at least 24 hours after your heart catheterization/angiography. 6. You may remove bandage from your Right and Groin in 24 hours. You may shower in 24 hours. No tub baths, hot tubs, or swimming for 1 week. Do not place any lotions, creams, powders, or ointments over puncture site for 1 week. You may place a clean band-aid over the puncture site each day for 5 days. Change daily. I have read the above instructions and have had the opportunity to ask questions.       Patient: ________________________   Date: 7/15/2020    Witness: _______________________   Date: 7/15/2020

## 2020-07-15 NOTE — Clinical Note
TRANSFER - IN REPORT:     Verbal report received from: MILTON Everett. Report consisted of patient's Situation, Background, Assessment and   Recommendations(SBAR). Opportunity for questions and clarification was provided. Assessment completed upon patient's arrival to unit and care assumed. Patient transported with a Registered Nurse.

## 2020-07-15 NOTE — H&P
HISTORY OF PRESENT ILLNESS  Virginia Knight is a 72 y.o. male.     HPI     Patient presents for a new office visit. He was referred here by his PCP for evaluation of episodic chest pain, exertional dyspnea and increased leg swelling which has been more noticeable over the past 6 months. Patient underwent noninvasive cardiac testing back in February 2019 and was evaluated by another cardiologist at that time. Both of his tests are unremarkable with the exception of concentric LVH on his echocardiogram.  Patient reports that he was evaluated in the emergency room at that time for chest pain and severely elevated blood pressure and was started on antihypertensive therapy.     However, for the past 6 months, he has noted decreased activity tolerance, profuse sweating with exertional activity along with worsening shortness of breath. He describes his chest pain as a tightness in the middle of his chest which usually occurs at rest and is not particular worse with exertion. He also complains of intermittent pain between his shoulder blades which is also more frequent at rest.  He has noticed increased leg swelling as well. He was started on scheduled furosemide by his PCP which has improved his symptoms. He denies any orthopnea or PND. No significant change in his weight over the past 6 months.   He states he was started on testosterone replacement therapy about 6 months ago.          Past Medical History:   Diagnosis Date    Abdominal pain      Anxiety      Bursitis      Depression      Diarrhea hypertension    Essential hypertension      H/O echocardiogram 02/2019     EF 54%, mild to moderate LVH, no valvular heart disease    History of nuclear stress test 02/2019     Normal low risk study, EF 56%    Joint pain      Obstructive sleep apnea on CPAP 01/2020    Urine incontinence              Current Outpatient Medications   Medication Sig Dispense Refill    sertraline (ZOLOFT) 100 mg tablet take 1 tablet by mouth every morning   0    cholecalciferol (VITAMIN D3) 2,000 unit cap capsule Vitamin D3 2,000 unit capsule   take 1 capsule by mouth once daily        losartan (COZAAR) 50 mg tablet losartan 50 mg tablet        chlorthalidone (HYGROTEN) 25 mg tablet     0    sildenafil citrate (VIAGRA) 100 mg tablet sildenafil 100 mg tablet   take 1 tablet by mouth daily if needed        traZODone (DESYREL) 50 mg tablet trazodone 50 mg tablet   take 1 tablet by mouth every evening        furosemide (LASIX) 20 mg tablet 1 tablet by mouth every day 6 hours before bedtime (Patient taking differently: 40 mg. 1 tablet by mouth every day 6 hours before bedtime) 30 Tab 6    potassium chloride (KLOR-CON) 10 mEq tablet 1 tablet by mouth every day 6 hours before bedtime 30 Tab 6    sertraline (ZOLOFT) 50 mg tablet take 1 tablet by mouth every morning   0          Allergies   Allergen Reactions    Amlodipine Swelling    Demerol [Meperidine] Nausea Only    Penicillin G Not Reported This Time         Social History           Tobacco Use    Smoking status: Never Smoker    Smokeless tobacco: Never Used   Substance Use Topics    Alcohol use: No    Drug use: No           Family History   Problem Relation Age of Onset    Cancer Mother      Heart Disease Father      Hypertension Father      Hypertension Sister      Diabetes Other             Review of Systems   Constitutional: Negative for chills, fever and weight loss. HENT: Negative for nosebleeds. Eyes: Negative for blurred vision and double vision. Respiratory: Positive for shortness of breath. Negative for cough and wheezing. Cardiovascular: Positive for chest pain and leg swelling. Negative for palpitations, orthopnea, claudication and PND. Gastrointestinal: Negative for abdominal pain, heartburn, nausea and vomiting. Genitourinary: Negative for dysuria and hematuria. Musculoskeletal: Negative for falls and myalgias. Skin: Negative for rash. Neurological: Negative for dizziness, focal weakness and headaches. Endo/Heme/Allergies: Does not bruise/bleed easily. Psychiatric/Behavioral: Negative for substance abuse.      Visit Vitals  /84   Pulse 64   Ht 5' 11\" (1.803 m)   Wt 121.6 kg (268 lb)   SpO2 97%   BMI 37.38 kg/m²        Physical Exam   Constitutional: He is oriented to person, place, and time. He appears well-developed and well-nourished. HENT:   Head: Normocephalic and atraumatic. Eyes: Conjunctivae are normal.   Neck: Neck supple. No JVD present. Carotid bruit is not present. Cardiovascular: Normal rate, regular rhythm, S1 normal, S2 normal and normal pulses. Exam reveals distant heart sounds. Exam reveals no gallop and no S3. No murmur heard. Pulmonary/Chest: Breath sounds normal. He has no wheezes. He has no rales. Abdominal: Soft. Bowel sounds are normal. There is no abdominal tenderness. Musculoskeletal:         General: Edema ( Trace bilateral lower extremity) present. No tenderness or deformity. Neurological: He is alert and oriented to person, place, and time. Skin: Skin is warm and dry. Psychiatric: He has a normal mood and affect. His behavior is normal. Thought content normal.      EKG: Normal sinus rhythm, normal axis, borderline low voltage, nonspecific ST elevation, likely due to early repolarization. No change compared to the previous EKG from November 2019.     ASSESSMENT and PLAN       Encounter Diagnoses   Name Primary?  Chest pain, unspecified type Yes    Severe obesity (HCC)      Essential hypertension      Dyspnea on exertion      Obstructive sleep apnea on CPAP      Dependent edema       Chest pain. Somewhat atypical for angina, but more concerning is his exertional dyspnea and decreased activity tolerance which has been, progressive the past 6 months.   I have recommended cardiac catheterization to definitively evaluate for any significant coronary disease and also to assess for evidence of increased left ventricular filling pressures.     Dyspnea on exertion. Cardiac cause will be evaluated for with a right and left heart catheterization in order to assess for significant pulmonary hypertension and diastolic dysfunction. For now I would continue his current diuretic regimen.     Essential hypertension. Patient blood pressure mildly elevated today in the office. Depending on results of his cardiac catheterization, may adjust his blood pressure medications at that time.     Obstructive sleep apnea. Patient has been recently started on CPAP within the past year or so. He was encouraged to use this regularly.     Severe obesity. Patient's weight has been fairly stable over the past 6 months. He was encouraged to try and lose much weight as possible lifestyle modification. We will proceed with cardiac catheterization today as scheduled. Labs reviewed. All questions answered.

## 2020-07-15 NOTE — PROGRESS NOTES
TRANSFER - OUT REPORT:    Verbal report given to [facility] RN on Sharyn Danger  being transferred to [facility] for ordered procedure       Report consisted of patients Situation, Background, Assessment and   Recommendations(SBAR). Information from the following report(s) SBAR, Procedure Summary and MAR was reviewed with the receiving nurse. Lines:       Opportunity for questions and clarification was provided.       Patient transported with:   Registered Nurse

## 2020-07-15 NOTE — Clinical Note
Bilateral groin clipped prepped with ChloraPrep and draped. Wet prep solution applied at: 1340. Wet prep solution dried at: 1343. Wet prep elapsed drying time: 3 mins.

## 2021-06-07 RX ORDER — TELMISARTAN 80 MG/1
TABLET ORAL
Qty: 30 TABLET | Refills: 5 | Status: SHIPPED | OUTPATIENT
Start: 2021-06-07 | End: 2021-12-09

## 2021-06-09 ENCOUNTER — OFFICE VISIT (OUTPATIENT)
Dept: CARDIOLOGY CLINIC | Age: 66
End: 2021-06-09
Payer: MEDICARE

## 2021-06-09 VITALS
OXYGEN SATURATION: 97 % | DIASTOLIC BLOOD PRESSURE: 70 MMHG | HEART RATE: 74 BPM | SYSTOLIC BLOOD PRESSURE: 122 MMHG | HEIGHT: 70 IN | BODY MASS INDEX: 37.94 KG/M2 | WEIGHT: 265 LBS

## 2021-06-09 DIAGNOSIS — I50.30 DIASTOLIC CONGESTIVE HEART FAILURE, UNSPECIFIED HF CHRONICITY (HCC): Primary | ICD-10-CM

## 2021-06-09 DIAGNOSIS — E66.01 SEVERE OBESITY (HCC): ICD-10-CM

## 2021-06-09 DIAGNOSIS — G47.33 OBSTRUCTIVE SLEEP APNEA ON CPAP: ICD-10-CM

## 2021-06-09 DIAGNOSIS — Z99.89 OBSTRUCTIVE SLEEP APNEA ON CPAP: ICD-10-CM

## 2021-06-09 DIAGNOSIS — I10 ESSENTIAL HYPERTENSION: ICD-10-CM

## 2021-06-09 PROCEDURE — 93000 ELECTROCARDIOGRAM COMPLETE: CPT | Performed by: INTERNAL MEDICINE

## 2021-06-09 PROCEDURE — 3017F COLORECTAL CA SCREEN DOC REV: CPT | Performed by: INTERNAL MEDICINE

## 2021-06-09 PROCEDURE — G8510 SCR DEP NEG, NO PLAN REQD: HCPCS | Performed by: INTERNAL MEDICINE

## 2021-06-09 PROCEDURE — 1101F PT FALLS ASSESS-DOCD LE1/YR: CPT | Performed by: INTERNAL MEDICINE

## 2021-06-09 PROCEDURE — G8754 DIAS BP LESS 90: HCPCS | Performed by: INTERNAL MEDICINE

## 2021-06-09 PROCEDURE — G8536 NO DOC ELDER MAL SCRN: HCPCS | Performed by: INTERNAL MEDICINE

## 2021-06-09 PROCEDURE — G8752 SYS BP LESS 140: HCPCS | Performed by: INTERNAL MEDICINE

## 2021-06-09 PROCEDURE — G8417 CALC BMI ABV UP PARAM F/U: HCPCS | Performed by: INTERNAL MEDICINE

## 2021-06-09 PROCEDURE — G8427 DOCREV CUR MEDS BY ELIG CLIN: HCPCS | Performed by: INTERNAL MEDICINE

## 2021-06-09 PROCEDURE — 99214 OFFICE O/P EST MOD 30 MIN: CPT | Performed by: INTERNAL MEDICINE

## 2021-06-09 NOTE — PROGRESS NOTES
HISTORY OF PRESENT ILLNESS  Wyatt Schilder is a 77 y.o. male. Follow-up  Associated symptoms include shortness of breath. Pertinent negatives include no chest pain, no abdominal pain and no headaches. Patient presents for an overdue follow-up office visit. He was initially referred here by his PCP for evaluation of episodic chest pain, exertional dyspnea and increased leg swelling which has been more noticeable over the past 6 months. Patient underwent noninvasive cardiac testing back in February 2019 and was evaluated by another cardiologist at that time. Both of his tests are unremarkable with the exception of concentric LVH on his echocardiogram.      The patient subsequently underwent a right and left heart catheterization by me in July 2020 which demonstrated single-vessel nonobstructive coronary disease of his mid LAD, a significant elevation in his LVEDP at 26 mmHg and mild pulmonary hypertension with a PA pressure of 42/18 with a mean of 27 mmHg. His PCWP was upper limits of normal.  He was then evaluated by pulmonologist for pulmonary hypertension and underwent a repeat right heart catheterization in November 2020 with a nitric oxide vasodilator challenge at [facility].  His resting PA pressure was 45/22 with a mean of 30 mmHg with no significant change following the vasodilator challenge. His PCWP was 27 mmHg and LVEDP was 30 mmHg. This was consistent with left ventricular diastolic dysfunction. He has since been treated with more aggressive antihypertensive agents and regular diuretic therapy. He was last seen in our office approximately 11 months ago. Since last visit, he reports that his shortness of breath has improved somewhat he still gets breathless with heavy physical activity. His leg swelling has improved as long as he remembers to take his diuretics. He has been checking his blood pressure regularly and is typically well controlled.   He denies any orthopnea or PND. He does use his CPAP regularly at night. Past Medical History:   Diagnosis Date    Abdominal pain     Anxiety     Bursitis     Depression     Diarrhea hypertension    Essential hypertension     H/O echocardiogram 02/2019    EF 54%, mild to moderate LVH, no valvular heart disease    History of nuclear stress test 02/2019    Normal low risk study, EF 56%    Joint pain     Obstructive sleep apnea on CPAP 01/2020    Urine incontinence      Current Outpatient Medications   Medication Sig Dispense Refill    telmisartan (MICARDIS) 80 mg tablet take 1 tablet by mouth once daily 30 Tablet 5    furosemide (LASIX) 40 mg tablet Take 1 Tab by mouth daily. Indications: Diastolic CHF 30 Tab 3    spironolactone (ALDACTONE) 25 mg tablet Take 1 Tab by mouth daily. 30 Tab 3    sertraline (ZOLOFT) 100 mg tablet take 1 tablet by mouth every morning  0    cholecalciferol (VITAMIN D3) 2,000 unit cap capsule Vitamin D3 2,000 unit capsule   take 1 capsule by mouth once daily      sildenafil citrate (VIAGRA) 100 mg tablet sildenafil 100 mg tablet   take 1 tablet by mouth daily if needed      traZODone (DESYREL) 50 mg tablet trazodone 50 mg tablet   take 1 tablet by mouth every evening       Allergies   Allergen Reactions    Amlodipine Swelling    Demerol [Meperidine] Nausea Only    Penicillin G Not Reported This Time        Social History     Tobacco Use    Smoking status: Never Smoker    Smokeless tobacco: Never Used   Substance Use Topics    Alcohol use: No    Drug use: No     Family History   Problem Relation Age of Onset    Cancer Mother     Heart Disease Father     Hypertension Father     Hypertension Sister     Diabetes Other          Review of Systems   Constitutional: Negative for chills, fever and weight loss. HENT: Negative for nosebleeds. Eyes: Negative for blurred vision and double vision. Respiratory: Positive for shortness of breath. Negative for cough and wheezing. Cardiovascular: Positive for leg swelling. Negative for chest pain, palpitations, orthopnea, claudication and PND. Gastrointestinal: Negative for abdominal pain, heartburn, nausea and vomiting. Genitourinary: Negative for dysuria and hematuria. Musculoskeletal: Negative for falls and myalgias. Skin: Negative for rash. Neurological: Negative for dizziness, focal weakness and headaches. Endo/Heme/Allergies: Does not bruise/bleed easily. Psychiatric/Behavioral: Negative for substance abuse. Visit Vitals  /70 (BP 1 Location: Right upper arm, BP Patient Position: Sitting, BP Cuff Size: Large adult)   Pulse 74   Ht 5' 10\" (1.778 m)   Wt 120.2 kg (265 lb)   SpO2 97%   BMI 38.02 kg/m²       Physical Exam  Constitutional:       Appearance: He is well-developed. HENT:      Head: Normocephalic and atraumatic. Eyes:      Conjunctiva/sclera: Conjunctivae normal.   Neck:      Vascular: No carotid bruit or JVD. Cardiovascular:      Rate and Rhythm: Normal rate and regular rhythm. Pulses: Normal pulses. Heart sounds: S1 normal and S2 normal. Heart sounds are distant. No murmur heard. No gallop. No S3 sounds. Pulmonary:      Breath sounds: Normal breath sounds. No wheezing or rales. Abdominal:      General: Bowel sounds are normal.      Palpations: Abdomen is soft. Tenderness: There is no abdominal tenderness. Musculoskeletal:         General: No tenderness or deformity. Cervical back: Neck supple. Skin:     General: Skin is warm and dry. Neurological:      Mental Status: He is alert and oriented to person, place, and time. Psychiatric:         Behavior: Behavior normal.         Thought Content: Thought content normal.       EKG: Normal sinus rhythm, normal axis, borderline left atrial enlargement, no ST or T wave abnormalities concerning for ischemia. No change compared to the previous EKG. No change compared to the previous EKG.     ASSESSMENT and PLAN  Encounter Diagnoses   Name Primary?  Diastolic congestive heart failure, unspecified HF chronicity (HCC) Yes    Severe obesity ([address]     Essential hypertension     Obstructive sleep apnea on CPAP      Chronic diastolic heart failure. This was confirmed with invasive cardiac catheterization in November 2020. Patient was found to have a significant elevation in his LVEDP at 30 mmHg with an elevated PCWP of 27 mmHg and mild pulmonary hypertension with a mean PA pressure of 30 mmHg. He also underwent a vasoactive challenge with inhaled nitric oxide which did not show any improvement in his PA pressures. Patient's volume status appears to have improved with his current diuretic regimen including furosemide 40 mg and spironolactone 25 mg. His blood pressure is likewise well controlled. I will continue his current medical regimen. I have recommended repeating an echocardiogram since his last study was nearly 2-1/2 years ago. Essential hypertension. This is now well controlled on his current medical regimen which includes telmisartan, spironolactone and furosemide. All of which I would continue. Obstructive sleep apnea. Patient has been has remained on on CPAP for the past 2 years. He states he uses this nightly. Severe obesity. Patient's weight has been fairly stable over the past 6 months. He was encouraged to try and lose much weight as possible lifestyle modification. Follow-up in 6 months, sooner if needed.

## 2021-06-09 NOTE — PROGRESS NOTES
Pedrito Duran presents today for   Chief Complaint   Patient presents with    Follow-up     Overdue f/u; 9 month f/u       Anikadina Michel preferred language for health care discussion is english/other. Is someone accompanying this pt? no    Is the patient using any DME equipment during [address]? no    Depression Screening:  3 most recent PHQ Screens 6/9/2021   Little interest or pleasure in doing things Not at all   Feeling down, depressed, irritable, or hopeless Not at all   Total Score PHQ 2 0       Learning Assessment:  Learning Assessment 11/3/2016   PRIMARY LEARNER Patient   PRIMARY LANGUAGE ENGLISH   LEARNER PREFERENCE PRIMARY READING   ANSWERED BY Patient   RELATIONSHIP SELF       Abuse Screening:  Abuse Screening Questionnaire 6/9/2021   Do you ever feel afraid of your partner? N   Are you in a relationship with someone who physically or mentally threatens you? N   Is it safe for you to go home? Y       Fall Risk  Fall Risk Assessment, last 12 mths 6/9/2021   Able to walk? Yes   Fall in past 12 months? 0   Do you feel unsteady? 0   Are you worried about falling 0       Pt currently taking Anticoagulant therapy? no    Coordination of Care:  1. Have you been to the ER, urgent care clinic since your last visit? Hospitalized since your last visit? no    2. Have you seen or consulted any other health care providers outside of the [address] [place] [address] since your last visit? Include any pap smears or colon screening.  no

## 2021-07-09 ENCOUNTER — TELEPHONE (OUTPATIENT)
Dept: CARDIOLOGY CLINIC | Age: 66
End: 2021-07-09

## 2021-07-09 NOTE — TELEPHONE ENCOUNTER
----- Message from Kelly Hutchison MD sent at 7/8/2021  2:40 PM EDT -----  Please let the patient know that his echocardiogram was unremarkable. Normal heart function, no valvular heart disease. No evidence of pulmonary hypertension.  ----- Message -----  From: Gabriel Davis LPN  Sent: 2/1/5707   2:05 PM EDT  To: Kelly Hutchison MD    Per your note - Chronic diastolic heart failure. This was confirmed with invasive cardiac catheterization in November 2020. Patient was found to have a significant elevation in his LVEDP at 30 mmHg with an elevated PCWP of 27 mmHg and mild pulmonary hypertension with a mean PA pressure of 30 mmHg. He also underwent a vasoactive challenge with inhaled nitric oxide which did not show any improvement in his PA pressures. Patient's volume status appears to have improved with his current diuretic regimen including furosemide 40 mg and spironolactone 25 mg. His blood pressure is likewise well controlled. I will continue his current medical regimen. I have recommended repeating an echocardiogram since his last study was nearly 2-1/2 years ago.

## 2021-07-09 NOTE — TELEPHONE ENCOUNTER
Patient made aware of echo results and Dr. Misael Giraldo remarks. No questions or concerns at present.

## 2021-12-09 ENCOUNTER — OFFICE VISIT (OUTPATIENT)
Dept: CARDIOLOGY CLINIC | Age: 66
End: 2021-12-09
Payer: MEDICARE

## 2021-12-09 VITALS
WEIGHT: 266 LBS | SYSTOLIC BLOOD PRESSURE: 128 MMHG | HEART RATE: 58 BPM | DIASTOLIC BLOOD PRESSURE: 80 MMHG | BODY MASS INDEX: 38.08 KG/M2 | HEIGHT: 70 IN | OXYGEN SATURATION: 97 %

## 2021-12-09 DIAGNOSIS — I50.32 CHRONIC DIASTOLIC CONGESTIVE HEART FAILURE (HCC): Primary | ICD-10-CM

## 2021-12-09 DIAGNOSIS — I10 ESSENTIAL HYPERTENSION: ICD-10-CM

## 2021-12-09 DIAGNOSIS — E66.01 SEVERE OBESITY (HCC): ICD-10-CM

## 2021-12-09 DIAGNOSIS — G47.33 OBSTRUCTIVE SLEEP APNEA ON CPAP: ICD-10-CM

## 2021-12-09 DIAGNOSIS — Z99.89 OBSTRUCTIVE SLEEP APNEA ON CPAP: ICD-10-CM

## 2021-12-09 PROCEDURE — 1101F PT FALLS ASSESS-DOCD LE1/YR: CPT | Performed by: INTERNAL MEDICINE

## 2021-12-09 PROCEDURE — G8510 SCR DEP NEG, NO PLAN REQD: HCPCS | Performed by: INTERNAL MEDICINE

## 2021-12-09 PROCEDURE — G8427 DOCREV CUR MEDS BY ELIG CLIN: HCPCS | Performed by: INTERNAL MEDICINE

## 2021-12-09 PROCEDURE — G8752 SYS BP LESS 140: HCPCS | Performed by: INTERNAL MEDICINE

## 2021-12-09 PROCEDURE — G8417 CALC BMI ABV UP PARAM F/U: HCPCS | Performed by: INTERNAL MEDICINE

## 2021-12-09 PROCEDURE — 99214 OFFICE O/P EST MOD 30 MIN: CPT | Performed by: INTERNAL MEDICINE

## 2021-12-09 PROCEDURE — 93000 ELECTROCARDIOGRAM COMPLETE: CPT | Performed by: INTERNAL MEDICINE

## 2021-12-09 PROCEDURE — 3017F COLORECTAL CA SCREEN DOC REV: CPT | Performed by: INTERNAL MEDICINE

## 2021-12-09 PROCEDURE — G8754 DIAS BP LESS 90: HCPCS | Performed by: INTERNAL MEDICINE

## 2021-12-09 PROCEDURE — G8536 NO DOC ELDER MAL SCRN: HCPCS | Performed by: INTERNAL MEDICINE

## 2021-12-09 RX ORDER — TRAZODONE HYDROCHLORIDE 50 MG/1
50 TABLET ORAL
COMMUNITY

## 2021-12-09 RX ORDER — METFORMIN HYDROCHLORIDE 500 MG/1
500 TABLET, EXTENDED RELEASE ORAL
COMMUNITY

## 2021-12-09 RX ORDER — FUROSEMIDE 80 MG/1
80 TABLET ORAL DAILY
COMMUNITY

## 2021-12-09 RX ORDER — TELMISARTAN 40 MG/1
40 TABLET ORAL DAILY
Qty: 30 TABLET | Refills: 6 | Status: SHIPPED | OUTPATIENT
Start: 2021-12-09 | End: 2022-07-20 | Stop reason: SDUPTHER

## 2021-12-09 RX ORDER — BENZONATATE 100 MG/1
100 CAPSULE ORAL
COMMUNITY

## 2021-12-09 NOTE — PROGRESS NOTES
HISTORY OF PRESENT ILLNESS  Jena Mora is a 77 y.o. male. Follow-up  Associated symptoms include shortness of breath. Pertinent negatives include no chest pain, no abdominal pain and no headaches. Patient presents for a follow-up office visit. He was initially referred here by his PCP for evaluation of episodic chest pain, exertional dyspnea and increased leg swelling which has been more noticeable over the past 6 months. Patient underwent noninvasive cardiac testing back in February 2019 and was evaluated by another cardiologist at that time. Both of his tests are unremarkable with the exception of concentric LVH on his echocardiogram.      The patient subsequently underwent a right and left heart catheterization by me in July 2020 which demonstrated single-vessel nonobstructive coronary disease of his mid LAD, a significant elevation in his LVEDP at 26 mmHg and mild pulmonary hypertension with a PA pressure of 42/18 with a mean of 27 mmHg. His PCWP was upper limits of normal.  He was then evaluated by pulmonologist for pulmonary hypertension and underwent a repeat right heart catheterization in November 2020 with a nitric oxide vasodilator challenge at [facility].  His resting PA pressure was 45/22 with a mean of 30 mmHg with no significant change following the vasodilator challenge. His PCWP was 27 mmHg and LVEDP was 30 mmHg. This was consistent with left ventricular diastolic dysfunction. He has since been treated with more aggressive antihypertensive agents and regular diuretic therapy. He underwent an echocardiogram in July 2021 which showed preserved LV function, EF 60 to 65% with mild concentric LVH and no valvular heart disease. He was last seen in our office 6 months ago. Patient states that he had a mild case of COVID-19 with pneumonia approximately 2 months ago.   After he recovered he noted that his blood pressure has been running low and he had some renal dysfunction, so his PCP stopped his blood pressure medications and decrease his diuretics. Over the past month, he has noted some excessive swelling primarily in his hands along with some shortness of breath and elevated blood pressure typically at night with systolic readings in the 343I. He denies any orthopnea or PND. Past Medical History:   Diagnosis Date    Abdominal pain     Anxiety     Bursitis     Depression     Diarrhea hypertension    Essential hypertension     H/O echocardiogram 02/2019    EF 54%, mild to moderate LVH, no valvular heart disease    History of nuclear stress test 02/2019    Normal low risk study, EF 56%    Joint pain     Obstructive sleep apnea on CPAP 01/2020    uses cpap    Pulmonary hypertension (HCC)     Urine incontinence      Current Outpatient Medications   Medication Sig Dispense Refill    furosemide (Lasix) 80 mg tablet Take 80 mg by mouth daily.  traZODone (DESYREL) 50 mg tablet Take 50 mg by mouth nightly.  benzonatate (TESSALON) 100 mg capsule Take 100 mg by mouth three (3) times daily as needed for Cough.  metFORMIN ER (GLUCOPHAGE XR) 500 mg tablet Take 500 mg by mouth daily (with dinner).  cpap machine kit by Does Not Apply route.  empagliflozin (Jardiance) 10 mg tablet Take 1 Tablet by mouth daily. 30 Tablet 6    telmisartan (MICARDIS) 40 mg tablet Take 1 Tablet by mouth daily.  30 Tablet 6    sertraline (ZOLOFT) 100 mg tablet take 1 tablet by mouth every morning  0    cholecalciferol (VITAMIN D3) 2,000 unit cap capsule Vitamin D3 2,000 unit capsule   take 1 capsule by mouth once daily       Allergies   Allergen Reactions    Amlodipine Swelling    Demerol [Meperidine] Nausea Only    Penicillin G Rash    Lisinopril Itching        Social History     Tobacco Use    Smoking status: Never Smoker    Smokeless tobacco: Never Used   Substance Use Topics    Alcohol use: No    Drug use: No     Family History   Problem Relation Age of Onset    Cancer Mother     Heart Disease Father     Hypertension Father     Hypertension Sister     Diabetes Other          Review of Systems   Constitutional: Negative for chills, fever and weight loss. HENT: Negative for nosebleeds. Eyes: Negative for blurred vision and double vision. Respiratory: Positive for shortness of breath. Negative for cough and wheezing. Cardiovascular: Positive for leg swelling. Negative for chest pain, palpitations, orthopnea, claudication and PND. Gastrointestinal: Negative for abdominal pain, heartburn, nausea and vomiting. Genitourinary: Negative for dysuria and hematuria. Musculoskeletal: Negative for falls and myalgias. Skin: Negative for rash. Neurological: Negative for dizziness, focal weakness and headaches. Endo/Heme/Allergies: Does not bruise/bleed easily. Psychiatric/Behavioral: Negative for substance abuse. Visit Vitals  /80 (BP 1 Location: Left upper arm, BP Patient Position: Sitting, BP Cuff Size: Large adult)   Pulse (!) 58   Ht 5' 10\" (1.778 m)   Wt 120.7 kg (266 lb)   SpO2 97%   BMI 38.17 kg/m²       Physical Exam  Constitutional:       Appearance: He is well-developed. HENT:      Head: Normocephalic and atraumatic. Eyes:      Conjunctiva/sclera: Conjunctivae normal.   Neck:      Vascular: No carotid bruit or JVD. Cardiovascular:      Rate and Rhythm: Regular rhythm. Bradycardia present. Pulses: Normal pulses. Heart sounds: S1 normal and S2 normal. Heart sounds are distant. No murmur heard. No gallop. No S3 sounds. Pulmonary:      Breath sounds: Normal breath sounds. No wheezing or rales. Abdominal:      General: Bowel sounds are normal.      Palpations: Abdomen is soft. Tenderness: There is no abdominal tenderness. Musculoskeletal:         General: No tenderness or deformity. Cervical back: Neck supple. Skin:     General: Skin is warm and dry.    Neurological:      Mental Status: He is alert and oriented to person, place, and time. Psychiatric:         Behavior: Behavior normal.         Thought Content: Thought content normal.       EKG: Normal sinus rhythm, normal axis, borderline left atrial enlargement, no ST or T wave abnormalities concerning for ischemia. No change compared to the previous EKG. No change compared to the previous EKG. ASSESSMENT and PLAN  Encounter Diagnoses   Name Primary?  Chronic diastolic congestive heart failure (HCC) Yes    Severe obesity ([address]     Essential hypertension     Obstructive sleep apnea on CPAP      Chronic diastolic heart failure. This was confirmed with invasive cardiac catheterization in November 2020. Patient was found to have a significant elevation in his LVEDP at 30 mmHg with an elevated PCWP of 27 mmHg and mild pulmonary hypertension with a mean PA pressure of 30 mmHg. He also underwent a vasoactive challenge with inhaled nitric oxide which did not show any improvement in his PA pressures. Follow-up echocardiogram in July 2021 again showed preserved LV function, EF 60 to 65%. Patient's volume status is somewhat difficult to ascertain. He is now taking furosemide 80 mg daily without spironolactone or metolazone. His blood pressure has been creeping upward since stopping his ARB. I have recommended resuming telmisartan at a lower dosage with 40 mg daily. Also like to start him on Jardiance 10 mg daily given the new HFpEF data on this medication. Ultimately he may require a lower dosage of loop diuretic once this is started. He should also adhere to a low-sodium diet. Essential hypertension. This is was previously well controlled up until he came down with COVID-19 2 months ago. Then his blood pressure became lower, so his telmisartan was stopped altogether along with his spironolactone. I have recommended resuming telmisartan at a lower dosage as described above. Obstructive sleep apnea.   Patient has been has remained on on CPAP for the past 2 years. He states he uses this nightly. Severe obesity. Patient's weight has been fairly stable over the past 12 months. He was encouraged to try and lose much weight as possible lifestyle modification. Follow-up in 3-4 months, sooner if needed.

## 2021-12-09 NOTE — PROGRESS NOTES
Oly Byrd presents today for   Chief Complaint   Patient presents with    Follow-up     6 month follow up       Oly Byrd preferred language for health care discussion is english/other. Is someone accompanying this pt? no    Is the patient using any DME equipment during [address]? no    Depression Screening:  3 most recent PHQ Screens 12/9/2021   Little interest or pleasure in doing things Not at all   Feeling down, depressed, irritable, or hopeless Not at all   Total Score PHQ 2 0       Learning Assessment:  Learning Assessment 12/9/2021   PRIMARY LEARNER Patient   PRIMARY LANGUAGE ENGLISH   LEARNER PREFERENCE PRIMARY DEMONSTRATION   ANSWERED BY patient   RELATIONSHIP SELF       Abuse Screening:  Abuse Screening Questionnaire 12/9/2021   Do you ever feel afraid of your partner? N   Are you in a relationship with someone who physically or mentally threatens you? N   Is it safe for you to go home? Y       Fall Risk  Fall Risk Assessment, last 12 mths 12/9/2021   Able to walk? Yes   Fall in past 12 months? 0   Do you feel unsteady? 0   Are you worried about falling 0           Pt currently taking Anticoagulant therapy? no    Pt currently taking Antiplatelet therapy ? no      Coordination of Care:  1. Have you been to the ER, urgent care clinic since your last visit? Hospitalized since your last visit? no    2. Have you seen or consulted any other health care providers outside of the [address] [place] [address] since your last visit? Include any pap smears or colon screening.  no

## 2022-03-18 PROBLEM — Z99.89 OBSTRUCTIVE SLEEP APNEA ON CPAP: Status: ACTIVE | Noted: 2020-07-08

## 2022-03-18 PROBLEM — G47.33 OBSTRUCTIVE SLEEP APNEA ON CPAP: Status: ACTIVE | Noted: 2020-07-08

## 2022-03-18 PROBLEM — I50.30 DIASTOLIC CONGESTIVE HEART FAILURE (HCC): Status: ACTIVE | Noted: 2021-06-09

## 2022-03-19 PROBLEM — I10 ESSENTIAL HYPERTENSION: Status: ACTIVE | Noted: 2020-07-08

## 2022-03-19 PROBLEM — N52.9 IMPOTENCE: Status: ACTIVE | Noted: 2018-02-21

## 2022-03-20 PROBLEM — E66.01 SEVERE OBESITY (HCC): Status: ACTIVE | Noted: 2020-07-08

## 2022-03-20 PROBLEM — R60.9 DEPENDENT EDEMA: Status: ACTIVE | Noted: 2020-07-08

## 2022-07-20 RX ORDER — TELMISARTAN 40 MG/1
40 TABLET ORAL DAILY
Qty: 90 TABLET | Refills: 3 | Status: SHIPPED | OUTPATIENT
Start: 2022-07-20

## 2023-01-15 PROCEDURE — 83497 ASSAY OF 5-HIAA: CPT

## 2023-01-15 PROCEDURE — 82530 CORTISOL FREE: CPT

## 2023-01-16 ENCOUNTER — HOSPITAL ENCOUNTER (OUTPATIENT)
Dept: LAB | Age: 68
Discharge: HOME | End: 2023-01-15
Payer: MEDICARE

## 2023-01-16 PROCEDURE — 83497 ASSAY OF 5-HIAA: CPT

## 2023-01-20 LAB
COLLECT DURATION TIME UR: 24 HR
CORTIS F 24H UR-MRATE: 32 UG/24 HR (ref 5–64)
CORTIS F UR-MCNC: 21 UG/L
SPECIMEN VOL ?TM UR: 1500 ML

## 2024-04-09 ENCOUNTER — TELEPHONE (OUTPATIENT)
Age: 69
End: 2024-04-09

## 2024-04-22 ENCOUNTER — OFFICE VISIT (OUTPATIENT)
Age: 69
End: 2024-04-22
Payer: MEDICARE

## 2024-04-22 VITALS
OXYGEN SATURATION: 97 % | WEIGHT: 240.4 LBS | RESPIRATION RATE: 14 BRPM | BODY MASS INDEX: 34.41 KG/M2 | TEMPERATURE: 97 F | HEART RATE: 74 BPM | HEIGHT: 70 IN | DIASTOLIC BLOOD PRESSURE: 70 MMHG | SYSTOLIC BLOOD PRESSURE: 110 MMHG

## 2024-04-22 DIAGNOSIS — K80.50 BILIARY COLIC: Primary | ICD-10-CM

## 2024-04-22 DIAGNOSIS — K80.20 GALLSTONES: ICD-10-CM

## 2024-04-22 PROCEDURE — 3074F SYST BP LT 130 MM HG: CPT | Performed by: SURGERY

## 2024-04-22 PROCEDURE — 3078F DIAST BP <80 MM HG: CPT | Performed by: SURGERY

## 2024-04-22 PROCEDURE — 1123F ACP DISCUSS/DSCN MKR DOCD: CPT | Performed by: SURGERY

## 2024-04-22 PROCEDURE — 99204 OFFICE O/P NEW MOD 45 MIN: CPT | Performed by: SURGERY

## 2024-04-23 ASSESSMENT — ENCOUNTER SYMPTOMS
NAUSEA: 0
SHORTNESS OF BREATH: 0
CHEST TIGHTNESS: 0
VOMITING: 0
ABDOMINAL PAIN: 1

## 2024-04-23 NOTE — PROGRESS NOTES
Erick SMITH Maximilianokranthi is a 68 y.o. male (: 1955)     Chief Complaint   Patient presents with    New Patient     Gallbladder        Medication list and allergies have been reviewed with Erick Arnold and updated as of today's date.     I have gone over all Medical, Surgical and Social History with Erick Arnold and updated/added the information accordingly.    
saphenofemoral junction demonstrates  normal compressibility with spontaneous and phasic venous hemodynamics.    LEFT LEG  There is normal compressibility of the contralateral common femoral vein  with spontaneous and phasic venous hemodynamics.    Electronically signed byDr. Ely Salas M.D   06/01/2023 05:04 PM         Physical Exam:  /70   Pulse 74   Temp 97 °F (36.1 °C)   Resp 14   Ht 1.778 m (5' 10\")   Wt 109 kg (240 lb 6.4 oz)   SpO2 97%   BMI 34.49 kg/m²  BMI: Body mass index is 34.49 kg/m².    Physical Exam  Constitutional:       Appearance: Normal appearance.   HENT:      Head: Normocephalic and atraumatic.   Eyes:      General: No scleral icterus.     Extraocular Movements: Extraocular movements intact.      Conjunctiva/sclera: Conjunctivae normal.      Pupils: Pupils are equal, round, and reactive to light.   Cardiovascular:      Rate and Rhythm: Normal rate.   Pulmonary:      Effort: Pulmonary effort is normal.   Skin:     Coloration: Skin is not jaundiced.   Neurological:      General: No focal deficit present.      Mental Status: He is alert and oriented to person, place, and time. Mental status is at baseline.   Psychiatric:         Mood and Affect: Mood normal.         Behavior: Behavior normal.         Thought Content: Thought content normal.         Judgment: Judgment normal.     I have reviewed the information entered by the clinical staff and/or patient and verified it as accurate or edited where necessary.     Electronically signed by:    Lucila Stevenson DO, MPH

## 2024-05-06 RX ORDER — METFORMIN HYDROCHLORIDE 500 MG/1
1 TABLET, EXTENDED RELEASE ORAL DAILY
COMMUNITY

## 2024-05-21 ENCOUNTER — ANESTHESIA EVENT (OUTPATIENT)
Facility: HOSPITAL | Age: 69
End: 2024-05-21
Payer: MEDICARE

## 2024-05-23 ENCOUNTER — ANESTHESIA (OUTPATIENT)
Facility: HOSPITAL | Age: 69
End: 2024-05-23
Payer: MEDICARE

## 2024-05-23 ENCOUNTER — HOSPITAL ENCOUNTER (OUTPATIENT)
Facility: HOSPITAL | Age: 69
Setting detail: OUTPATIENT SURGERY
Discharge: HOME | End: 2024-05-23
Attending: SURGERY | Admitting: SURGERY
Payer: MEDICARE

## 2024-05-23 VITALS
RESPIRATION RATE: 14 BRPM | BODY MASS INDEX: 33.93 KG/M2 | DIASTOLIC BLOOD PRESSURE: 84 MMHG | SYSTOLIC BLOOD PRESSURE: 132 MMHG | HEART RATE: 51 BPM | TEMPERATURE: 97.9 F | WEIGHT: 237 LBS | HEIGHT: 70 IN | OXYGEN SATURATION: 97 %

## 2024-05-23 DIAGNOSIS — Z90.49 S/P LAPAROSCOPIC CHOLECYSTECTOMY: Primary | ICD-10-CM

## 2024-05-23 LAB — GLUCOSE BLD STRIP.AUTO-MCNC: 130 MG/DL (ref 70–110)

## 2024-05-23 PROCEDURE — A4217 STERILE WATER/SALINE, 500 ML: HCPCS | Performed by: SURGERY

## 2024-05-23 PROCEDURE — 2700000000 HC OXYGEN THERAPY PER DAY

## 2024-05-23 PROCEDURE — 7100000000 HC PACU RECOVERY - FIRST 15 MIN: Performed by: SURGERY

## 2024-05-23 PROCEDURE — 7100000011 HC PHASE II RECOVERY - ADDTL 15 MIN: Performed by: SURGERY

## 2024-05-23 PROCEDURE — 6360000002 HC RX W HCPCS: Performed by: SURGERY

## 2024-05-23 PROCEDURE — 2500000003 HC RX 250 WO HCPCS: Performed by: NURSE ANESTHETIST, CERTIFIED REGISTERED

## 2024-05-23 PROCEDURE — 88304 TISSUE EXAM BY PATHOLOGIST: CPT

## 2024-05-23 PROCEDURE — 7100000010 HC PHASE II RECOVERY - FIRST 15 MIN: Performed by: SURGERY

## 2024-05-23 PROCEDURE — 2580000003 HC RX 258: Performed by: NURSE ANESTHETIST, CERTIFIED REGISTERED

## 2024-05-23 PROCEDURE — 3700000000 HC ANESTHESIA ATTENDED CARE: Performed by: SURGERY

## 2024-05-23 PROCEDURE — 2500000003 HC RX 250 WO HCPCS: Performed by: SURGERY

## 2024-05-23 PROCEDURE — 3600000012 HC SURGERY LEVEL 2 ADDTL 15MIN: Performed by: SURGERY

## 2024-05-23 PROCEDURE — 3700000001 HC ADD 15 MINUTES (ANESTHESIA): Performed by: SURGERY

## 2024-05-23 PROCEDURE — 82962 GLUCOSE BLOOD TEST: CPT

## 2024-05-23 PROCEDURE — 2709999900 HC NON-CHARGEABLE SUPPLY: Performed by: SURGERY

## 2024-05-23 PROCEDURE — C1889 IMPLANT/INSERT DEVICE, NOC: HCPCS | Performed by: SURGERY

## 2024-05-23 PROCEDURE — 6360000002 HC RX W HCPCS: Performed by: NURSE ANESTHETIST, CERTIFIED REGISTERED

## 2024-05-23 PROCEDURE — 2580000003 HC RX 258: Performed by: SURGERY

## 2024-05-23 PROCEDURE — A4216 STERILE WATER/SALINE, 10 ML: HCPCS | Performed by: NURSE ANESTHETIST, CERTIFIED REGISTERED

## 2024-05-23 PROCEDURE — 3600000002 HC SURGERY LEVEL 2 BASE: Performed by: SURGERY

## 2024-05-23 PROCEDURE — 94761 N-INVAS EAR/PLS OXIMETRY MLT: CPT

## 2024-05-23 PROCEDURE — 7100000001 HC PACU RECOVERY - ADDTL 15 MIN: Performed by: SURGERY

## 2024-05-23 PROCEDURE — 47562 LAPAROSCOPIC CHOLECYSTECTOMY: CPT | Performed by: SURGERY

## 2024-05-23 PROCEDURE — 6370000000 HC RX 637 (ALT 250 FOR IP): Performed by: SURGERY

## 2024-05-23 DEVICE — CLIP INT L POLYMER LOK LIG HEM O LOK (6EA/PK): Type: IMPLANTABLE DEVICE | Site: ABDOMEN | Status: FUNCTIONAL

## 2024-05-23 RX ORDER — SODIUM CHLORIDE, SODIUM LACTATE, POTASSIUM CHLORIDE, CALCIUM CHLORIDE 600; 310; 30; 20 MG/100ML; MG/100ML; MG/100ML; MG/100ML
INJECTION, SOLUTION INTRAVENOUS CONTINUOUS
Status: DISCONTINUED | OUTPATIENT
Start: 2024-05-23 | End: 2024-05-23 | Stop reason: HOSPADM

## 2024-05-23 RX ORDER — PROPOFOL 10 MG/ML
INJECTION, EMULSION INTRAVENOUS PRN
Status: DISCONTINUED | OUTPATIENT
Start: 2024-05-23 | End: 2024-05-23 | Stop reason: SDUPTHER

## 2024-05-23 RX ORDER — HYDROCODONE BITARTRATE AND ACETAMINOPHEN 5; 325 MG/1; MG/1
1 TABLET ORAL ONCE
Status: COMPLETED | OUTPATIENT
Start: 2024-05-23 | End: 2024-05-23

## 2024-05-23 RX ORDER — DEXTROSE MONOHYDRATE 100 MG/ML
INJECTION, SOLUTION INTRAVENOUS CONTINUOUS PRN
Status: DISCONTINUED | OUTPATIENT
Start: 2024-05-23 | End: 2024-05-23 | Stop reason: HOSPADM

## 2024-05-23 RX ORDER — SODIUM CHLORIDE 0.9 % (FLUSH) 0.9 %
5-40 SYRINGE (ML) INJECTION PRN
Status: DISCONTINUED | OUTPATIENT
Start: 2024-05-23 | End: 2024-05-23 | Stop reason: HOSPADM

## 2024-05-23 RX ORDER — NALOXONE HYDROCHLORIDE 0.4 MG/ML
INJECTION, SOLUTION INTRAMUSCULAR; INTRAVENOUS; SUBCUTANEOUS PRN
Status: DISCONTINUED | OUTPATIENT
Start: 2024-05-23 | End: 2024-05-23 | Stop reason: HOSPADM

## 2024-05-23 RX ORDER — NEOSTIGMINE METHYLSULFATE 1 MG/ML
INJECTION, SOLUTION INTRAVENOUS PRN
Status: DISCONTINUED | OUTPATIENT
Start: 2024-05-23 | End: 2024-05-23 | Stop reason: SDUPTHER

## 2024-05-23 RX ORDER — SODIUM CHLORIDE 9 MG/ML
INJECTION, SOLUTION INTRAVENOUS PRN
Status: DISCONTINUED | OUTPATIENT
Start: 2024-05-23 | End: 2024-05-23 | Stop reason: HOSPADM

## 2024-05-23 RX ORDER — HYDROCODONE BITARTRATE AND ACETAMINOPHEN 5; 325 MG/1; MG/1
1 TABLET ORAL EVERY 4 HOURS PRN
Qty: 30 TABLET | Refills: 0 | Status: SHIPPED | OUTPATIENT
Start: 2024-05-23 | End: 2024-05-28

## 2024-05-23 RX ORDER — LIDOCAINE HYDROCHLORIDE 20 MG/ML
INJECTION, SOLUTION EPIDURAL; INFILTRATION; INTRACAUDAL; PERINEURAL PRN
Status: DISCONTINUED | OUTPATIENT
Start: 2024-05-23 | End: 2024-05-23 | Stop reason: SDUPTHER

## 2024-05-23 RX ORDER — ONDANSETRON 2 MG/ML
INJECTION INTRAMUSCULAR; INTRAVENOUS PRN
Status: DISCONTINUED | OUTPATIENT
Start: 2024-05-23 | End: 2024-05-23 | Stop reason: SDUPTHER

## 2024-05-23 RX ORDER — SUCCINYLCHOLINE/SOD CL,ISO/PF 100 MG/5ML
SYRINGE (ML) INTRAVENOUS PRN
Status: DISCONTINUED | OUTPATIENT
Start: 2024-05-23 | End: 2024-05-23 | Stop reason: SDUPTHER

## 2024-05-23 RX ORDER — FENTANYL CITRATE 50 UG/ML
INJECTION, SOLUTION INTRAMUSCULAR; INTRAVENOUS PRN
Status: DISCONTINUED | OUTPATIENT
Start: 2024-05-23 | End: 2024-05-23 | Stop reason: SDUPTHER

## 2024-05-23 RX ORDER — ONDANSETRON 2 MG/ML
4 INJECTION INTRAMUSCULAR; INTRAVENOUS
Status: DISCONTINUED | OUTPATIENT
Start: 2024-05-23 | End: 2024-05-23 | Stop reason: HOSPADM

## 2024-05-23 RX ORDER — MIDAZOLAM HYDROCHLORIDE 1 MG/ML
INJECTION INTRAMUSCULAR; INTRAVENOUS PRN
Status: DISCONTINUED | OUTPATIENT
Start: 2024-05-23 | End: 2024-05-23 | Stop reason: SDUPTHER

## 2024-05-23 RX ORDER — ROCURONIUM BROMIDE 10 MG/ML
INJECTION, SOLUTION INTRAVENOUS PRN
Status: DISCONTINUED | OUTPATIENT
Start: 2024-05-23 | End: 2024-05-23 | Stop reason: SDUPTHER

## 2024-05-23 RX ORDER — KETOROLAC TROMETHAMINE 15 MG/ML
INJECTION, SOLUTION INTRAMUSCULAR; INTRAVENOUS PRN
Status: DISCONTINUED | OUTPATIENT
Start: 2024-05-23 | End: 2024-05-23 | Stop reason: SDUPTHER

## 2024-05-23 RX ORDER — FENTANYL CITRATE 50 UG/ML
25 INJECTION, SOLUTION INTRAMUSCULAR; INTRAVENOUS EVERY 5 MIN PRN
Status: DISCONTINUED | OUTPATIENT
Start: 2024-05-23 | End: 2024-05-23 | Stop reason: HOSPADM

## 2024-05-23 RX ORDER — SODIUM CHLORIDE 0.9 % (FLUSH) 0.9 %
5-40 SYRINGE (ML) INJECTION EVERY 12 HOURS SCHEDULED
Status: DISCONTINUED | OUTPATIENT
Start: 2024-05-23 | End: 2024-05-23 | Stop reason: HOSPADM

## 2024-05-23 RX ORDER — LIDOCAINE HYDROCHLORIDE 10 MG/ML
1 INJECTION, SOLUTION EPIDURAL; INFILTRATION; INTRACAUDAL; PERINEURAL
Status: DISCONTINUED | OUTPATIENT
Start: 2024-05-23 | End: 2024-05-23 | Stop reason: HOSPADM

## 2024-05-23 RX ORDER — GLYCOPYRROLATE 0.2 MG/ML
INJECTION INTRAMUSCULAR; INTRAVENOUS PRN
Status: DISCONTINUED | OUTPATIENT
Start: 2024-05-23 | End: 2024-05-23 | Stop reason: SDUPTHER

## 2024-05-23 RX ADMIN — Medication 4 MG: at 09:33

## 2024-05-23 RX ADMIN — ROCURONIUM BROMIDE 5 MG: 50 INJECTION INTRAVENOUS at 09:05

## 2024-05-23 RX ADMIN — FENTANYL CITRATE 50 MCG: 50 INJECTION INTRAMUSCULAR; INTRAVENOUS at 09:40

## 2024-05-23 RX ADMIN — GLYCOPYRROLATE 0.4 MG: 0.2 INJECTION INTRAMUSCULAR; INTRAVENOUS at 09:33

## 2024-05-23 RX ADMIN — LIDOCAINE HYDROCHLORIDE 100 MG: 20 INJECTION, SOLUTION EPIDURAL; INFILTRATION; INTRACAUDAL; PERINEURAL at 09:01

## 2024-05-23 RX ADMIN — MIDAZOLAM 2 MG: 1 INJECTION, SOLUTION INTRAMUSCULAR; INTRAVENOUS at 08:49

## 2024-05-23 RX ADMIN — FAMOTIDINE 20 MG: 10 INJECTION, SOLUTION INTRAVENOUS at 08:04

## 2024-05-23 RX ADMIN — HYDROCODONE BITARTRATE AND ACETAMINOPHEN 1 TABLET: 5; 325 TABLET ORAL at 11:14

## 2024-05-23 RX ADMIN — KETOROLAC TROMETHAMINE 15 MG: 15 INJECTION, SOLUTION INTRAMUSCULAR; INTRAVENOUS at 09:30

## 2024-05-23 RX ADMIN — ROCURONIUM BROMIDE 20 MG: 50 INJECTION INTRAVENOUS at 09:11

## 2024-05-23 RX ADMIN — PROPOFOL 200 MG: 10 INJECTION, EMULSION INTRAVENOUS at 09:01

## 2024-05-23 RX ADMIN — SODIUM CHLORIDE, SODIUM LACTATE, POTASSIUM CHLORIDE, AND CALCIUM CHLORIDE: 600; 310; 30; 20 INJECTION, SOLUTION INTRAVENOUS at 08:04

## 2024-05-23 RX ADMIN — GLYCOPYRROLATE 0.2 MG: 0.2 INJECTION INTRAMUSCULAR; INTRAVENOUS at 08:49

## 2024-05-23 RX ADMIN — ONDANSETRON 4 MG: 2 INJECTION INTRAMUSCULAR; INTRAVENOUS at 09:30

## 2024-05-23 RX ADMIN — Medication 180 MG: at 09:05

## 2024-05-23 RX ADMIN — VANCOMYCIN HYDROCHLORIDE 1500 MG: 10 INJECTION, POWDER, LYOPHILIZED, FOR SOLUTION INTRAVENOUS at 08:04

## 2024-05-23 RX ADMIN — FENTANYL CITRATE 50 MCG: 50 INJECTION INTRAMUSCULAR; INTRAVENOUS at 09:01

## 2024-05-23 RX ADMIN — SODIUM CHLORIDE, SODIUM LACTATE, POTASSIUM CHLORIDE, AND CALCIUM CHLORIDE: 600; 310; 30; 20 INJECTION, SOLUTION INTRAVENOUS at 09:28

## 2024-05-23 ASSESSMENT — PAIN DESCRIPTION - PAIN TYPE
TYPE: SURGICAL PAIN

## 2024-05-23 ASSESSMENT — PAIN - FUNCTIONAL ASSESSMENT
PAIN_FUNCTIONAL_ASSESSMENT: ACTIVITIES ARE NOT PREVENTED
PAIN_FUNCTIONAL_ASSESSMENT: 0-10
PAIN_FUNCTIONAL_ASSESSMENT: ACTIVITIES ARE NOT PREVENTED

## 2024-05-23 ASSESSMENT — PAIN DESCRIPTION - FREQUENCY
FREQUENCY: CONTINUOUS
FREQUENCY: CONTINUOUS

## 2024-05-23 ASSESSMENT — PAIN SCALES - GENERAL
PAINLEVEL_OUTOF10: 4
PAINLEVEL_OUTOF10: 4
PAINLEVEL_OUTOF10: 3

## 2024-05-23 ASSESSMENT — PAIN DESCRIPTION - DESCRIPTORS
DESCRIPTORS: PRESSURE;ACHING
DESCRIPTORS: ACHING

## 2024-05-23 ASSESSMENT — PAIN DESCRIPTION - LOCATION
LOCATION: ABDOMEN

## 2024-05-23 ASSESSMENT — PAIN DESCRIPTION - ONSET
ONSET: GRADUAL
ONSET: PROGRESSIVE

## 2024-05-23 NOTE — ANESTHESIA POSTPROCEDURE EVALUATION
Department of Anesthesiology  Postprocedure Note    Patient: Erick Arnold  MRN: [id number]  YOB: 1955  Date of evaluation: 5/23/2024    Procedure Summary       Date: 05/23/24 Room / Location: [facility] MAIN 01 / [facility] MAIN OR    Anesthesia Start: 0849 Anesthesia Stop: 0955    Procedure: LAPAROSCOPIC CHOLECYSTECTOMY (Abdomen) Diagnosis:       Biliary colic      (Biliary colic [K80.50])    Surgeons: Lucila Stevenson DO Responsible Provider: Slime Garnica MD    Anesthesia Type: General ASA Status: 3            Anesthesia Type: General    Millie Phase I: Millie Score: 9    Millie Phase II:      Anesthesia Post Evaluation    Patient location during evaluation: PACU  Patient participation: complete - patient participated  Level of consciousness: awake and alert  Pain score: 3  Airway patency: patent  Nausea & Vomiting: no nausea and no vomiting  Cardiovascular status: hemodynamically stable  Respiratory status: acceptable  Hydration status: euvolemic  Multimodal analgesia pain management approach  Pain management: adequate    No notable events documented.

## 2024-05-23 NOTE — OP NOTE
Laparoscopic Cholecystectomy Operative Report    Patient Name: Erick Arnold     SURGERY DATE: 24     : 1955     AGE: 69 y.o.     Anesthesiologist: Anesthesiologist: Slime Garnica MD  CRNA: Cally Simpson APRN - CRNA     Surgeon: Lucila Stevenson DO    Anesthesia: General     Surgical assist: Circulator: Helena Chau RN  Surgical Assistant: Paco Infante  Relief Circulator: Tahira Chandler RN  Relief Scrub: Doris Flores  Scrub Person First: Finesse Yissel JUAN CARLOS  Circulator Assist: Angella Childers RN    PreOp DX: Biliary colic [K80.50]     PostOp DX: same    Procedure: Laparoscopic cholecystectomy     Procedure Details: After informed consent was obtained the patient was taken to the operating room and placed in the supine position.  General anesthesia was administered by the anesthetist to titrate to effect.  The abdomen was prepped and draped in the usual sterile fashion and a timeout procedure was performed.  Next using 11 blade scalpel a 5 mm incision was made superior to the umbilicus.  A Veress needle was used to establish pneumoperitoneum and a 5 mm trocar was inserted.  The laparoscope was inserted.  Next under direct visualization 3 additional trochars were placed, one 12 mm in the epigastric region and 2 5 mm along the right costal margin. The gallbladder was then retracted over the liver and out laterally.  A [place] dissector was used to dissect out the cystic duct and artery which were clearly visualized entering the gallbladder. Two clips were placed proximally and one distally each on the duct and artery.  They were then transected with laparoscopic scissors.  Bovie electrocautery was then used to dissect the gallbladder off the liver bed.  The gallbladder was then placed in Endo Catch bag and removed from the epigastric trocar site. The  liver bed was inspected and found to be hemostatic.  The clips were visualized in good position.  Pneumoperitoneum

## 2024-05-23 NOTE — ANESTHESIA PRE PROCEDURE
GI/Hepatic/Renal: Neg GI/Hepatic/Renal ROS            Endo/Other:                     Abdominal:             Vascular: negative vascular ROS.         Other Findings:       Anesthesia Plan      general     ASA 3       Induction: intravenous.      Anesthetic plan and risks discussed with patient.      Plan discussed with CRNA.                JANINA FIGUEROA MD   5/23/2024

## 2024-05-23 NOTE — INTERVAL H&P NOTE
Update History & Physical    The patient's History and Physical of 5/23/2024 was reviewed with the patient and I examined the patient. There was no change. The surgical site was confirmed by the patient and me.     Plan: The risks, benefits, expected outcome, and alternative to the recommended procedure have been discussed with the patient. Patient understands and wants to proceed with the procedure.     Electronically signed by Lucila Stevenson DO on 5/23/2024 at 8:06 AM

## 2024-05-23 NOTE — DISCHARGE INSTRUCTIONS
Post Operative Discharge Instructions    No driving for 24 hours after surgery and off of prescription pain medication.    Avoid activities that bump or cause jarring movements at the surgical site for 10 days.    No lifting more than 10-15 pounds for 6 weeks after surgery or until cleared for activity at your follow up.    Walking is encouraged after surgery.    Stairs are ok to climb.      DIET:    Diet as tolerated. Start with liquids then advance your diet based on how you fell.    No alcoholic beverages for 24 hours after surgery or while on antibiotics or pain mdications.    Drink plenty of water.        MEDICATIONS:    Use daily stool softners (over the counter such as Colace or Senekot) while on pain medications.     Resume pre-operative medications. If you are on any blood thinners see special instructions below.    Use prescriptions given or Tylenol, Ibuprofen as needed for pain.    Do not use more than 4000mg of Tylenol (acetaminophen) per day. Be aware this may be  in your prescription medication as well.    Be aware narcotic prescriptions are tightly controlled in the [facility]. If requiring more than one refill, a follow up appointment will be required.      WOUND CARE:      You have skin glue on your incisions, you may shower in 24 hours and pat dry. Glue will fall off on it's own    Do not tub bathe, swim, or soak incisions until cleared to do so at your follow up.    Ice bag to the affected area; 20 minutes on and 20 minutes off if desired.      FOLLOW UP CARE:    You should have an appointment scheduled within 14 days after surgery. If this is not yet scheduled, call the office.  Any forms that you need filled out regarding your medical care can be brought to the office at follow up appointment of faxed to: [phone]        CALL DOCTOR IF:  Temperature is over 101 degrees, a slight fever can be normal 24-48 hour after surgery.  Nausea & vomiting that persists more than 24 hours after

## 2024-06-07 ENCOUNTER — OFFICE VISIT (OUTPATIENT)
Age: 69
End: 2024-06-07

## 2024-06-07 VITALS
HEART RATE: 67 BPM | OXYGEN SATURATION: 98 % | DIASTOLIC BLOOD PRESSURE: 76 MMHG | TEMPERATURE: 97.5 F | HEIGHT: 70 IN | WEIGHT: 242.6 LBS | SYSTOLIC BLOOD PRESSURE: 138 MMHG | RESPIRATION RATE: 14 BRPM | BODY MASS INDEX: 34.73 KG/M2

## 2024-06-07 DIAGNOSIS — Z90.49 S/P LAPAROSCOPIC CHOLECYSTECTOMY: Primary | ICD-10-CM

## 2024-06-07 PROCEDURE — 99024 POSTOP FOLLOW-UP VISIT: CPT | Performed by: SURGERY

## 2024-06-07 NOTE — PROGRESS NOTES
CC:   Chief Complaint   Patient presents with    Post-Op Check     Cholecystectomy 05/23        Assessment:    ICD-10-CM    1. S/P laparoscopic cholecystectomy  Z90.49           Plan: I reviewed the pathology report demonstrating mild chronic cholecystitis as well as cholelithiasis.  He should continue with lifting restrictions for a total of 6 weeks from surgery and then may resume activity as tolerated after that time.  There is no need for additional follow-up unless there are questions or concerns in the future.  He agrees with this plan.        HPI:  Erick Arnold is a 69 y.o. male who is status to post laparoscopic cholecystectomy on 5/23/2024.  He has no fevers, chills or drainage from his incisions.    Allergies:  Allergies   Allergen Reactions    Amlodipine Swelling    Meperidine Nausea Only    Lisinopril Itching    Penicillin G Rash       Medication Review:  Current Outpatient Medications on File Prior to Visit   Medication Sig Dispense Refill    vitamin D (CHOLECALCIFEROL) 125 MCG (5000 UT) CAPS capsule Take 1 capsule by mouth daily      vitamin D (CHOLECALCIFEROL) 50 MCG (2000 UT) CAPS capsule Take 1 capsule by mouth daily      hydrOXYzine HCl (ATARAX) 25 MG tablet       potassium chloride (KLOR-CON M) 10 MEQ extended release tablet       sertraline (ZOLOFT) 50 MG tablet Take 1 tablet by mouth every morning      testosterone cypionate (DEPOTESTOTERONE CYPIONATE) 200 MG/ML injection Inject 0.5 mL subcutaneous weekly 10 mL 1    Syringe, Disposable, (B-D SYRINGE LUER-ELDER 1CC) 1 ML MISC 0.5 mLs by Does not apply route once a week 50 each 3    NEEDLE, DISP, 25 G (B-D DISP NEEDLE 25GX1\") 25G X 1\" MISC 0.5 mLs by Does not apply route once a week 50 each 0    NEEDLE, DISP, 18 G (B-D BLUNT FILL NEEDLE) 18G X 1-1/2\" MISC 0.5 mLs by Does not apply route once a week 50 each 0    tadalafil (CIALIS) 20 MG tablet Take 1 tablet by mouth daily as needed for Erectile Dysfunction 30 tablet 1    metFORMIN

## 2024-06-07 NOTE — PROGRESS NOTES
Erick Arnold is a 69 y.o. male (: 1955)     Chief Complaint   Patient presents with    Post-Op Check     Cholecystectomy        Medication list and allergies have been reviewed with Erick Arnold and updated as of today's date.     I have gone over all Medical, Surgical and Social History with Erick Arnold and updated/added the information accordingly.      1. Have you been to the ER, urgent care clinic since your last visit?  Hospitalized since your last visit?No    2. Have you seen or consulted any other health care providers outside of the [facility] since your last visit?  Include any pap smears or colon screening. No
